# Patient Record
Sex: FEMALE | Race: WHITE
[De-identification: names, ages, dates, MRNs, and addresses within clinical notes are randomized per-mention and may not be internally consistent; named-entity substitution may affect disease eponyms.]

---

## 2019-07-16 ENCOUNTER — HOSPITAL ENCOUNTER (INPATIENT)
Dept: HOSPITAL 36 - ER | Age: 49
LOS: 7 days | Discharge: NURSING, REHAB OR LONG-TERM CARE | DRG: 885 | End: 2019-07-23
Attending: PSYCHIATRY & NEUROLOGY | Admitting: PSYCHIATRY & NEUROLOGY
Payer: MEDICARE

## 2019-07-16 VITALS — SYSTOLIC BLOOD PRESSURE: 113 MMHG | DIASTOLIC BLOOD PRESSURE: 78 MMHG

## 2019-07-16 DIAGNOSIS — Z88.8: ICD-10-CM

## 2019-07-16 DIAGNOSIS — F29: ICD-10-CM

## 2019-07-16 DIAGNOSIS — E78.5: ICD-10-CM

## 2019-07-16 DIAGNOSIS — J44.9: ICD-10-CM

## 2019-07-16 DIAGNOSIS — K59.09: ICD-10-CM

## 2019-07-16 DIAGNOSIS — E11.9: ICD-10-CM

## 2019-07-16 DIAGNOSIS — F31.9: ICD-10-CM

## 2019-07-16 DIAGNOSIS — F20.0: Primary | ICD-10-CM

## 2019-07-16 DIAGNOSIS — I10: ICD-10-CM

## 2019-07-16 LAB
AMPHET UR-MCNC: NEGATIVE NG/ML
APPEARANCE UR: CLEAR
BACTERIA #/AREA URNS HPF: (no result) /HPF
BARBITURATES UR-MCNC: NEGATIVE UG/ML
BENZODIAZEPINES PNL UR: NEGATIVE
BILIRUB UR-MCNC: NEGATIVE MG/DL
CANNABINOIDS SERPL QL CFM: NEGATIVE
COCAINE METAB.OTHER UR-MCNC: NEGATIVE NG/ML
COLOR UR: YELLOW
EPI CELLS URNS QL MICRO: (no result) /LPF
GLUCOSE UR STRIP-MCNC: NEGATIVE MG/DL
KETONES UR STRIP-MCNC: NEGATIVE MG/DL
LEUKOCYTE ESTERASE UR-ACNC: NEGATIVE
METHADONE UR CFM-MCNC: NEGATIVE NG/ML
METHAMPHET UR QL: NEGATIVE
MICRO URNS: YES
NITRITE UR QL STRIP: NEGATIVE
OPIATES UR QL: NEGATIVE
PCP UR-MCNC: NEGATIVE UG/L
PH UR STRIP: 6 [PH] (ref 4.6–8)
PROT UR STRIP-MCNC: NEGATIVE MG/DL
RBC # UR STRIP: NEGATIVE /UL
RBC #/AREA URNS HPF: (no result) /HPF (ref 0–5)
SP GR UR STRIP: <= 1.005 (ref 1–1.03)
TRICYCLICS UR QL: NEGATIVE
URINALYSIS COMPLETE PNL UR: (no result)
UROBILINOGEN UR STRIP-ACNC: 0.2 E.U./DL (ref 0.2–1)
WBC #/AREA URNS HPF: (no result) /HPF (ref 0–5)

## 2019-07-16 PROCEDURE — Z7610: HCPCS

## 2019-07-16 NOTE — ED PHYSICIAN CHART
ED Chief Complaint/HPI





- Patient Information


Date Seen:: 07/16/19


Time Seen:: 14:15


Chief Complaint:: Agitation


History of Present Illness:: 





onset x 2 days of agitation; no report of trauma, H/As, SIs, neck pain, C/P, SOB

, Abd. Pain, or urinary s/s


Allergies:: 


 Allergies











Allergy/AdvReac Type Severity Reaction Status Date / Time


 


clonazepam [From Klonopin] Allergy   Verified 07/16/19 14:26


 


haloperidol [From Haldol] Allergy   Verified 07/16/19 14:25


 


meloxicam [From Mobic] Allergy   Verified 07/16/19 14:26











Vitals:: 


 Vital Signs - 8 hr











  07/16/19





  14:14


 


Temp 98.5 F


 


HR 86


 


RR 16


 


/82


 


O2 Sat % 98











Historian:: Patient, EMS


Review:: Nurse's Note Reviewed, Old Chart Reviewed, EMS run form Reviewed





ED Review of Systems





- Review of Systems


General/Constitutional: No fever, No chills, No weight loss, No weakness, No 

diaphoresis, No edema, No loss of appetite


Skin: No skin lesions, No rash, No bruising


Head: No headache, No light-headedness


Eyes: No loss of vision, No pain, No diplopia


ENT: No earache, No nasal drainage, No sore throat, No tinnitus


Neck: No neck pain, No swelling, No thyromegaly, No stiffness, No mass noted


Cardio Vascular: No chest pain, No palpitations, No PND, No orthopnea, No edema


Pulmonary: No SOB, No cough, No sputum, No wheezing


GI: No nausea, No vomiting, No diarrhea, No pain, No melena, No hematochezia, 

No constipation, No hematemesis


G/U: No dysuria, No frequency, No hematuria, No nacturia


Ob/Gyn: No vaginal discharge, No abnormal vaginal bleed, No contraction


Musculoskeletal: No bone or joint pain, No back pain, No muscle pain


Endocrine: No polyuria, No polydipsia


Psychiatric: Prior psych history, Depression, Anxiety, No suicidal ideation, No 

homicidal ideation, No auditory hallucination, No visual hallucination


Hematopoietic: No bruising, No lymphadenopathy


Allergic/Immuno: No urticaria, No angioedema


Neurological: No syncope, No focal symptoms, No weakness, No paresthesia, No 

headache, No seizure, No dizziness, No confusion, No vertigo





ED Past Medical History





- Past Medical History


Obtainable: Yes


Past Medical History: HTN


Family History: HTN


Social History: Non Smoker, No Alcohol, No Drug Use, Single, Care Facility


Surgical History: None


Psychiatricy History: Depression, Bipolar


Medication: Reviewed





Family Medical History





- Family Member


  ** Mother


History Unknown: Yes





ED Physical Exam





- Physical Examination


General/Constitutional: Awake, Well-developed, well-nourished, Alert, No 

distress, GCS 15, Non-toxic appearing, Ambulatory


Head: Atraumatic


Eyes: Lids, conjuctiva normal, PERRL, EOMI


Skin: Nl inspection, No rash, No skin lesions, No ecchymosis, Well hydrated, No 

lymphadenopathy


ENMT: External ears, nose nl, TM canals nl, Nasal exam nl, Lips, teeth, gums nl

, Oropharynx nl, Tonsils nl


Neck: Nontender, Full ROM w/o pain, No JVD, No nuchal rigidity, No bruit, No 

mass, No stridor


Respiratory: Nl effort/Exclusion, Clear to Auscultation, No Wheeze/Rhonchi/Rales


Cardio Vascular: RRR, No murmur, gallop, rubs, NL S1 S2, Carotid/Femoral/Distal 

pulses equal bilaterally


GI: No tenderness/rebounding/guarding, No organomegaly, No hernia, Normal BS's, 

Nondistended, No mass/bruits, No McBurney tenderness


: No CVA tenderness


Extremities: No tenderness or effusion, Full ROM, normal strength in all 

extremities, No edema, Normal digits & nails


Neuro/Psych: Alert/oriented, DTR's symmetric, Normal sensory exam, Normal motor 

strength, Judgement/insight normal, Mood normal, Normal gait, No focal deficits


Other Neuro/Psych comments:: 





+ Psychomotor Agitation; no SIs; Mood/Affect: Labile


Misc: Normal back, No paraspinal tenderness





ED Labs/Radiology/EKG Results





- Lab Results


Results: 


 Laboratory Tests











  07/16/19 07/16/19 07/16/19





  14:15 15:15 15:15


 


Urine Source    CLEAN C


 


Urine Color    YELLOW


 


Urine Clarity    CLEAR


 


Urine pH    6.0


 


Ur Specific Gravity    <= 1.005


 


Urine Protein    NEGATIVE


 


Urine Glucose (UA)    NEGATIVE


 


Urine Ketones    NEGATIVE


 


Urine Blood    NEGATIVE


 


Urine Nitrate    NEGATIVE


 


Urine Bilirubin    NEGATIVE


 


Urine Urobilinogen    0.2


 


Ur Leukocyte Esterase    NEGATIVE


 


Urine RBC    0-2


 


Urine WBC    0-2


 


Ur Epithelial Cells    FEW


 


Urine Bacteria    FEW


 


Urine Pregnancy Test  NEGATIVE  


 


Urine Opiates Screen   NEGATIVE 


 


Urine Methadone Screen   NEGATIVE 


 


Ur Barbiturates Screen   NEGATIVE 


 


Ur Tricyclics Screen   NEGATIVE 


 


Ur Phencyclidine Scrn   NEGATIVE 


 


Amphetamines Screen   NEGATIVE 


 


U Methamphetamines Scrn   NEGATIVE 


 


U Benzodiazepines Scrn   NEGATIVE 


 


U Cocaine Metab Screen   NEGATIVE 


 


U Cannabinoids Screen   NEGATIVE 











Comments:: 





Reviewed





- EKG Interpretations


EKG Time:: 15:03


Rate & Rhythm: 79; NSR


Comments:: 





small T-Wave Inversions in V1 and V2; non-specific st-t changes





ED Septic Shock





- .


Is Septic Shock (SBP<90, OR Lactate>4 mmol\L) present?: No





- <6hrs of presentation:


Vital Signs: 


 Vital Signs - 8 hr











  07/16/19





  14:14


 


Temp 98.5 F


 


HR 86


 


RR 16


 


/82


 


O2 Sat % 98














ED Reassessment (Disposition)





- Reassessment


Reassessment Condition:: Improved





- Diagnosis


Diagnosis:: 





Agitation; Medical Clearance; Bipolar Disorder





- Aftercare/Follow up Instructions


Aftercare/Follow-Up Instructions:: Counseled pt regarding lab results/diagnosis 

& need follow up, Counseled pt & family regarding lab results/diagnosis & need 

follow up





- Patient Disposition


Discharge/Transfer:: Acute Care w/in this hosp


Admitted to:: [facility]


Condition at Disposition:: Stable, Improved

## 2019-07-16 NOTE — HISTORY & PHYSICAL
ADMIT DATE:  07/16/2019



HISTORY OF PRESENT ILLNESS:  The patient is a 48-year-old female with long

history of diabetes mellitus, hyperlipidemia, psychosis, admitted to [facility] under Dr. Sotelo's service.  The patient denies any chest

pain, shortness of breath, nausea, vomiting, fever or chills.



PAST MEDICAL HISTORY:  Significant for diabetes mellitus, hyperlipidemia,

chronic constipation, psychosis.



PAST SURGICAL HISTORY:  No recent surgery.



ALLERGIES:  None.



MEDICATIONS:  Follow admission reconciliation.



SOCIAL HISTORY:  No smoker, no alcohol, no drug.



FAMILY HISTORY:  Noncontributory.



REVIEW OF SYSTEMS:

RENAL SYSTEM:  No history of chronic renal disorder.

CARDIOVASCULAR SYSTEM:  No coronary artery disease.

ENDOCRINE SYSTEM:  She has diabetes mellitus.

GASTROINTESTINAL SYSTEM:  She has chronic constipation.

NEUROLOGICAL SYSTEM:  No seizure disorder.

SKELETOMUSCULAR SYSTEM:  No muscular dystrophy.

HEMATOLOGIC SYSTEM:  No bleeding tendencies.

RESPIRATORY SYSTEM:  No asthma.

GENITOURINARY:  No dysuria or hematuria.



PHYSICAL EXAMINATION:

GENERAL:  She is awake, alert.

VITAL SIGNS:  Temperature is 97.2, heart rate 74, blood pressure 112/75.

HEENT:  Normocephalic.  Pupils are reactive to light and accommodation.  Sclerae

clear.

NECK:  Supple.  Negative for lymphadenopathy, JVD or bruit.

CHEST:  Bilaterally normal.  No rales, rhonchi or wheezing.

HEART:  S1, S2 normal.  No murmur or gallop.

ABDOMEN:  Soft, bowel sounds positive.

EXTREMITIES:  No edema.

BACK:  Normal vertebrae.

BREASTS, PELVIC AND RECTAL:  Done by primary physician, no complaint.

NEUROLOGIC:  She is awake, alert, mildly confused.  No focal muscle deficits. 

Cranial nerves 2 through 12 are intact.



ASSESSMENT:

1.  Diabetes mellitus.

2.  Hyperlipidemia.

3.  Constipation.

4.  Psychosis.



PLAN:  The patient admitted to the hospital under Dr. Sotelo's service.  Medical

problem addressed during this hospitalization is psychosis.  Medical problems

addressed at discharge, diabetes mellitus, hyperlipidemia.  The patient is

medically stable for activity.



Thank you Dr. Sotelo for asking me to see your patient.  The patient is a full

code.





DD: 07/16/2019 22:59

DT: 07/16/2019 23:17

JOB# [id number]  [id number]

## 2019-07-17 LAB
ALBUMIN SERPL-MCNC: 4.6 GM/DL (ref 3.7–5.3)
ALBUMIN/GLOB SERPL: 1.5 {RATIO} (ref 1–1.8)
ALP SERPL-CCNC: 74 U/L (ref 34–104)
ALT SERPL-CCNC: 19 U/L (ref 7–52)
ANION GAP SERPL CALC-SCNC: 13.6 MMOL/L (ref 7–16)
APAP SERPL-MCNC: < 10 UG/ML (ref 10–30)
AST SERPL-CCNC: 19 U/L (ref 13–39)
BASOPHILS # BLD AUTO: 0.1 TH/CUMM (ref 0–0.2)
BASOPHILS NFR BLD AUTO: 0.8 % (ref 0–2)
BILIRUB SERPL-MCNC: 0.3 MG/DL (ref 0.3–1)
BUN SERPL-MCNC: 7 MG/DL (ref 7–25)
CALCIUM SERPL-MCNC: 9.8 MG/DL (ref 8.6–10.3)
CHLORIDE SERPL-SCNC: 97 MEQ/L (ref 98–107)
CHOLEST SERPL-MCNC: 156 MG/DL (ref ?–200)
CO2 SERPL-SCNC: 23.5 MEQ/L (ref 21–31)
CREAT SERPL-MCNC: 0.6 MG/DL (ref 0.6–1.2)
EOSINOPHIL # BLD AUTO: 0.1 TH/CMM (ref 0.1–0.4)
EOSINOPHIL NFR BLD AUTO: 2 % (ref 0–5)
ERYTHROCYTE [DISTWIDTH] IN BLOOD BY AUTOMATED COUNT: 12.3 % (ref 11.5–20)
GLOBULIN SER-MCNC: 3.1 GM/DL
GLUCOSE SERPL-MCNC: 176 MG/DL (ref 70–105)
HCT VFR BLD CALC: 42.4 % (ref 41–60)
HDLC SERPL-MCNC: 57 MG/DL (ref 23–92)
HGB BLD-MCNC: 13.9 GM/DL (ref 12–16)
LYMPHOCYTE AB SER FC-ACNC: 2 TH/CMM (ref 1.5–3)
LYMPHOCYTES NFR BLD AUTO: 30.1 % (ref 20–50)
MCH RBC QN AUTO: 30.4 PG (ref 27–31)
MCHC RBC AUTO-ENTMCNC: 32.7 PG (ref 28–36)
MCV RBC AUTO: 92.9 FL (ref 81–100)
MONOCYTES # BLD AUTO: 0.6 TH/CMM (ref 0.3–1)
MONOCYTES NFR BLD AUTO: 9.9 % (ref 2–10)
NEUTROPHILS # BLD: 3.7 TH/CMM (ref 1.8–8)
NEUTROPHILS NFR BLD AUTO: 57.2 % (ref 40–80)
PLATELET # BLD: 315 TH/CMM (ref 150–400)
POTASSIUM SERPL-SCNC: 4.1 MEQ/L (ref 3.5–5.1)
RBC # BLD AUTO: 4.56 MIL/CMM (ref 3.8–5.1)
SODIUM SERPL-SCNC: 130 MEQ/L (ref 136–145)
TRIGL SERPL-MCNC: 114 MG/DL (ref ?–150)
WBC # BLD AUTO: 6.5 TH/CMM (ref 4.8–10.8)

## 2019-07-17 NOTE — INTERNAL MEDICINE PROG NOTE
Internal Medicine Subjective





- Subjective


Service Date: 07/17/19


Patient seen and examined:: with staff


Patient is:: awake, verbal, in bed, talking, confused


Per staff patient has:: no adverse event





Internal Medicine Objective





- Results


Result Diagrams: 


 07/17/19 07:00





 07/17/19 07:00


Recent Labs: 


 Laboratory Last Values











WBC  6.5 Th/cmm (4.8-10.8)   07/17/19  07:00    


 


RBC  4.56 Mil/cmm (3.80-5.10)   07/17/19  07:00    


 


Hgb  13.9 gm/dL (12-16)   07/17/19  07:00    


 


Hct  42.4 % (41.0-60)   07/17/19  07:00    


 


MCV  92.9 fl ()   07/17/19  07:00    


 


MCH  30.4 pg (27.0-31.0)   07/17/19  07:00    


 


MCHC Differential  32.7 pg (28.0-36.0)   07/17/19  07:00    


 


RDW  12.3 % (11.5-20.0)   07/17/19  07:00    


 


Plt Count  315 Th/cmm (150-400)   07/17/19  07:00    


 


MPV  7.3 fl  07/17/19  07:00    


 


Neutrophils %  57.2 % (40.0-80.0)   07/17/19  07:00    


 


Lymphocytes %  30.1 % (20.0-50.0)   07/17/19  07:00    


 


Monocytes %  9.9 % (2.0-10.0)   07/17/19  07:00    


 


Eosinophils %  2.0 % (0.0-5.0)   07/17/19  07:00    


 


Basophils %  0.8 % (0.0-2.0)   07/17/19  07:00    


 


Sodium  130 mEq/L (136-145)  L  07/17/19  07:00    


 


Potassium  4.1 mEq/L (3.5-5.1)   07/17/19  07:00    


 


Chloride  97 mEq/L ()  L  07/17/19  07:00    


 


Carbon Dioxide  23.5 mEq/L (21.0-31.0)   07/17/19  07:00    


 


Anion Gap  13.6  (7.0-16.0)   07/17/19  07:00    


 


BUN  7 mg/dL (7-25)   07/17/19  07:00    


 


Creatinine  0.6 mg/dL (0.6-1.2)   07/17/19  07:00    


 


Est GFR ( Amer)  > 60.0 ml/min (>90)   07/17/19  07:00    


 


Est GFR (Non-Af Amer)  > 60.0 ml/min  07/17/19  07:00    


 


BUN/Creatinine Ratio  11.7   07/17/19  07:00    


 


Glucose  176 mg/dL ()  H  07/17/19  07:00    


 


Calcium  9.8 mg/dL (8.6-10.3)   07/17/19  07:00    


 


Total Bilirubin  0.3 mg/dL (0.3-1.0)   07/17/19  07:00    


 


AST  19 U/L (13-39)   07/17/19  07:00    


 


ALT  19 U/L (7-52)   07/17/19  07:00    


 


Alkaline Phosphatase  74 U/L ()   07/17/19  07:00    


 


Troponin I  < 0.01 ng/mL (0.01-0.05)  L  07/17/19  07:00    


 


Total Protein  7.7 gm/dL (6.0-8.3)   07/17/19  07:00    


 


Albumin  4.6 gm/dL (3.7-5.3)   07/17/19  07:00    


 


Globulin  3.1 gm/dL  07/17/19  07:00    


 


Albumin/Globulin Ratio  1.5  (1.0-1.8)   07/17/19  07:00    


 


Triglycerides  114 mg/dL (<150)   07/17/19  07:00    


 


Cholesterol  156 mg/dL (<200)   07/17/19  07:00    


 


LDL Cholesterol Direct  77 mg/dL ()   07/17/19  07:00    


 


HDL Cholesterol  57 mg/dL (23-92)   07/17/19  07:00    


 


TSH  1.25 uIU/ml (0.34-5.60)   07/17/19  07:00    


 


Serum Pregnancy, Qual  NEGATIVE  (NEGATIVE)   07/17/19  07:00    


 


Urine Source  CLEAN C   07/16/19  15:15    


 


Urine Color  YELLOW   07/16/19  15:15    


 


Urine Clarity  CLEAR  (CLEAR)   07/16/19  15:15    


 


Urine pH  6.0  (4.6 - 8.0)   07/16/19  15:15    


 


Ur Specific Gravity  <= 1.005  (1.005-1.030)   07/16/19  15:15    


 


Urine Protein  NEGATIVE mg/dL (NEGATIVE)   07/16/19  15:15    


 


Urine Glucose (UA)  NEGATIVE mg/dL (NEGATIVE)   07/16/19  15:15    


 


Urine Ketones  NEGATIVE mg/dL (NEGATIVE)   07/16/19  15:15    


 


Urine Blood  NEGATIVE  (NEGATIVE)   07/16/19  15:15    


 


Urine Nitrate  NEGATIVE  (NEGATIVE)   07/16/19  15:15    


 


Urine Bilirubin  NEGATIVE  (NEGATIVE)   07/16/19  15:15    


 


Urine Urobilinogen  0.2 E.U./dL (0.2 - 1.0)   07/16/19  15:15    


 


Ur Leukocyte Esterase  NEGATIVE  (NEGATIVE)   07/16/19  15:15    


 


Urine RBC  0-2 /hpf (0-5)   07/16/19  15:15    


 


Urine WBC  0-2 /hpf (0-5)   07/16/19  15:15    


 


Ur Epithelial Cells  FEW /lpf (FEW)   07/16/19  15:15    


 


Urine Bacteria  FEW /hpf (NONE SEEN)   07/16/19  15:15    


 


Urine Pregnancy Test  NEGATIVE   07/16/19  14:15    


 


Salicylates  < 25.0 mg/L (30.0-100.0)  L  07/17/19  07:00    


 


Urine Opiates Screen  NEGATIVE  (NEGATIVE)   07/16/19  15:15    


 


Urine Methadone Screen  NEGATIVE  (NEGATIVE)   07/16/19  15:15    


 


Acetaminophen  < 10.0 ug/mL (10.0-30.0)  L  07/17/19  07:00    


 


Ur Barbiturates Screen  NEGATIVE  (NEGATIVE)   07/16/19  15:15    


 


Ur Tricyclics Screen  NEGATIVE  (NEGATIVE)   07/16/19  15:15    


 


Ur Phencyclidine Scrn  NEGATIVE  (NEGATIVE)   07/16/19  15:15    


 


Amphetamines Screen  NEGATIVE  (NEGATIVE)   07/16/19  15:15    


 


U Methamphetamines Scrn  NEGATIVE  (NEGATIVE)   07/16/19  15:15    


 


U Benzodiazepines Scrn  NEGATIVE  (NEGATIVE)   07/16/19  15:15    


 


U Cocaine Metab Screen  NEGATIVE  (NEGATIVE)   07/16/19  15:15    


 


U Cannabinoids Screen  NEGATIVE  (NEGATIVE)   07/16/19  15:15    


 


Ethyl Alcohol  < 10 mg/dL (0-10)   07/17/19  07:00    














- Physical Exam


Vitals and I&O: 


 Vital Signs











Temp  97.3 F   07/17/19 14:00


 


Pulse  78   07/17/19 14:00


 


Resp  20   07/17/19 14:00


 


BP  132/88   07/17/19 14:00


 


Pulse Ox  97   07/17/19 14:00








 Intake & Output











 07/17/19 07/17/19 07/18/19





 06:59 18:59 06:59


 


Intake Total 300 1200 


 


Balance 300 1200 


 


Intake:   


 


  Oral 300 1200 


 


Other:   


 


  # Voids 3  


 


  # Bowel Movements 0 1 











Active Medications: 


Current Medications





Acetaminophen (Tylenol)  650 mg PO Q4HR PRN


   PRN Reason: Mild Pain / Temp above 100


   Stop: 09/14/19 18:14


Al Hydrox/Mg Hydrox/Simethicone (Maalox)  30 ml PO Q4HR PRN


   PRN Reason: GI DISTRESS


   Stop: 09/14/19 18:14


Atorvastatin Calcium (Lipitor)  10 mg PO HS [facility]; Protocol


   Stop: 09/14/19 20:59


   Last Admin: 07/16/19 21:11 Dose:  10 mg


Carbamazepine (Tegretol)  200 mg PO TID [facility]; Protocol


   Stop: 09/14/19 20:59


Docusate Sodium (Colace)  250 mg PO BID [facility]


   Stop: 09/15/19 08:59


   Last Admin: 07/17/19 16:19 Dose:  Not Given


Fluphenazine HCl (Prolixin)  5 mg PO DAILY [facility]; Protocol


   Stop: 09/15/19 08:59


Glipizide (Glucotrol)  5 mg PO BID [facility]


   Stop: 09/15/19 08:59


   Last Admin: 07/17/19 16:20 Dose:  5 mg


Lorazepam (Ativan)  0.5 mg PO Q4HR PRN; Protocol


   PRN Reason: Agitation


   Stop: 08/15/19 18:14


Magnesium Hydroxide (Milk Of Magnesia)  30 ml PO HS PRN


   PRN Reason: Constipation


Metformin HCl (Glucophage)  1,000 mg PO BID [facility]


   Stop: 09/15/19 08:59


   Last Admin: 07/17/19 16:20 Dose:  1,000 mg


Miscellaneous (Clinical Monitoring)  1 ea [facility] DAILY PRN


   PRN Reason: RENAL DOSING (METFORMIN)


   Stop: 09/15/19 07:34


Multivitamins/Vitamin C (Theragran)  1 tab PO DAILY [facility]


   Stop: 09/15/19 08:59


   Last Admin: 07/17/19 08:27 Dose:  1 tab


Olanzapine (Zyprexa)  10 mg PO DAILY [facility]; Protocol


   Stop: 09/15/19 08:59


Olanzapine (Zyprexa)  15 mg PO HS [facility]; Protocol


   Stop: 09/14/19 20:59


Zolpidem Tartrate (Ambien)  5 mg PO HS PRN


   PRN Reason: Insomnia


   Stop: 09/14/19 18:14


   Last Admin: 07/16/19 21:11 Dose:  5 mg








General: demented


HEENT: NC/AT, PERRLA, EOMI, anicteric sclerae, throat clear


Neck: Supple, No JVD, No thyromegaly, +2 carotid pulse wo bruit, No LAD


Abdomen: soft, non-tender, non-distended


Extremities: clear


Neurological: no change





Internal Medicine Assmt/Plan





- Assessment


Assessment: 





1.DM.


2.HYPERLIPIDEMIA.


3.CONSTIPATION.


4.PSYCHOSIS





- Plan


Plan: 





CONTINUE ON CURRENT MEDICATION AND DIET

## 2019-07-18 LAB — HBA1C BLD-MCNC: 5.6 % (ref 4.8–5.6)

## 2019-07-18 NOTE — INTERNAL MEDICINE PROG NOTE
Internal Medicine Subjective





- Subjective


Service Date: 07/18/19


Patient seen and examined:: without staff (SHE FEELS WELL)


Patient is:: awake, verbal, in bed, talking, confused


Per staff patient has:: no adverse event





Internal Medicine Objective





- Results


Result Diagrams: 


 07/17/19 07:00





 07/17/19 07:00


Recent Labs: 


 Laboratory Last Values











WBC  6.5 Th/cmm (4.8-10.8)   07/17/19  07:00    


 


RBC  4.56 Mil/cmm (3.80-5.10)   07/17/19  07:00    


 


Hgb  13.9 gm/dL (12-16)   07/17/19  07:00    


 


Hct  42.4 % (41.0-60)   07/17/19  07:00    


 


MCV  92.9 fl ()   07/17/19  07:00    


 


MCH  30.4 pg (27.0-31.0)   07/17/19  07:00    


 


MCHC Differential  32.7 pg (28.0-36.0)   07/17/19  07:00    


 


RDW  12.3 % (11.5-20.0)   07/17/19  07:00    


 


Plt Count  315 Th/cmm (150-400)   07/17/19  07:00    


 


MPV  7.3 fl  07/17/19  07:00    


 


Neutrophils %  57.2 % (40.0-80.0)   07/17/19  07:00    


 


Lymphocytes %  30.1 % (20.0-50.0)   07/17/19  07:00    


 


Monocytes %  9.9 % (2.0-10.0)   07/17/19  07:00    


 


Eosinophils %  2.0 % (0.0-5.0)   07/17/19  07:00    


 


Basophils %  0.8 % (0.0-2.0)   07/17/19  07:00    


 


Sodium  130 mEq/L (136-145)  L  07/17/19  07:00    


 


Potassium  4.1 mEq/L (3.5-5.1)   07/17/19  07:00    


 


Chloride  97 mEq/L ()  L  07/17/19  07:00    


 


Carbon Dioxide  23.5 mEq/L (21.0-31.0)   07/17/19  07:00    


 


Anion Gap  13.6  (7.0-16.0)   07/17/19  07:00    


 


BUN  7 mg/dL (7-25)   07/17/19  07:00    


 


Creatinine  0.6 mg/dL (0.6-1.2)   07/17/19  07:00    


 


Est GFR ( Amer)  > 60.0 ml/min (>90)   07/17/19  07:00    


 


Est GFR (Non-Af Amer)  > 60.0 ml/min  07/17/19  07:00    


 


BUN/Creatinine Ratio  11.7   07/17/19  07:00    


 


Glucose  176 mg/dL ()  H  07/17/19  07:00    


 


Calcium  9.8 mg/dL (8.6-10.3)   07/17/19  07:00    


 


Total Bilirubin  0.3 mg/dL (0.3-1.0)   07/17/19  07:00    


 


AST  19 U/L (13-39)   07/17/19  07:00    


 


ALT  19 U/L (7-52)   07/17/19  07:00    


 


Alkaline Phosphatase  74 U/L ()   07/17/19  07:00    


 


Troponin I  < 0.01 ng/mL (0.01-0.05)  L  07/17/19  07:00    


 


Total Protein  7.7 gm/dL (6.0-8.3)   07/17/19  07:00    


 


Albumin  4.6 gm/dL (3.7-5.3)   07/17/19  07:00    


 


Globulin  3.1 gm/dL  07/17/19  07:00    


 


Albumin/Globulin Ratio  1.5  (1.0-1.8)   07/17/19  07:00    


 


Triglycerides  114 mg/dL (<150)   07/17/19  07:00    


 


Cholesterol  156 mg/dL (<200)   07/17/19  07:00    


 


LDL Cholesterol Direct  77 mg/dL ()   07/17/19  07:00    


 


HDL Cholesterol  57 mg/dL (23-92)   07/17/19  07:00    


 


TSH  1.25 uIU/ml (0.34-5.60)   07/17/19  07:00    


 


Serum Pregnancy, Qual  NEGATIVE  (NEGATIVE)   07/17/19  07:00    


 


Urine Source  CLEAN C   07/16/19  15:15    


 


Urine Color  YELLOW   07/16/19  15:15    


 


Urine Clarity  CLEAR  (CLEAR)   07/16/19  15:15    


 


Urine pH  6.0  (4.6 - 8.0)   07/16/19  15:15    


 


Ur Specific Gravity  <= 1.005  (1.005-1.030)   07/16/19  15:15    


 


Urine Protein  NEGATIVE mg/dL (NEGATIVE)   07/16/19  15:15    


 


Urine Glucose (UA)  NEGATIVE mg/dL (NEGATIVE)   07/16/19  15:15    


 


Urine Ketones  NEGATIVE mg/dL (NEGATIVE)   07/16/19  15:15    


 


Urine Blood  NEGATIVE  (NEGATIVE)   07/16/19  15:15    


 


Urine Nitrate  NEGATIVE  (NEGATIVE)   07/16/19  15:15    


 


Urine Bilirubin  NEGATIVE  (NEGATIVE)   07/16/19  15:15    


 


Urine Urobilinogen  0.2 E.U./dL (0.2 - 1.0)   07/16/19  15:15    


 


Ur Leukocyte Esterase  NEGATIVE  (NEGATIVE)   07/16/19  15:15    


 


Urine RBC  0-2 /hpf (0-5)   07/16/19  15:15    


 


Urine WBC  0-2 /hpf (0-5)   07/16/19  15:15    


 


Ur Epithelial Cells  FEW /lpf (FEW)   07/16/19  15:15    


 


Urine Bacteria  FEW /hpf (NONE SEEN)   07/16/19  15:15    


 


Urine Pregnancy Test  NEGATIVE   07/16/19  14:15    


 


Salicylates  < 25.0 mg/L (30.0-100.0)  L  07/17/19  07:00    


 


Urine Opiates Screen  NEGATIVE  (NEGATIVE)   07/16/19  15:15    


 


Urine Methadone Screen  NEGATIVE  (NEGATIVE)   07/16/19  15:15    


 


Acetaminophen  < 10.0 ug/mL (10.0-30.0)  L  07/17/19  07:00    


 


Ur Barbiturates Screen  NEGATIVE  (NEGATIVE)   07/16/19  15:15    


 


Ur Tricyclics Screen  NEGATIVE  (NEGATIVE)   07/16/19  15:15    


 


Ur Phencyclidine Scrn  NEGATIVE  (NEGATIVE)   07/16/19  15:15    


 


Amphetamines Screen  NEGATIVE  (NEGATIVE)   07/16/19  15:15    


 


U Methamphetamines Scrn  NEGATIVE  (NEGATIVE)   07/16/19  15:15    


 


U Benzodiazepines Scrn  NEGATIVE  (NEGATIVE)   07/16/19  15:15    


 


U Cocaine Metab Screen  NEGATIVE  (NEGATIVE)   07/16/19  15:15    


 


U Cannabinoids Screen  NEGATIVE  (NEGATIVE)   07/16/19  15:15    


 


Ethyl Alcohol  < 10 mg/dL (0-10)   07/17/19  07:00    


 


RPR  NONREACTIVE  (NONREACTIVE)   07/17/19  07:00    














- Physical Exam


Vitals and I&O: 


 Vital Signs











Temp  98.8 F   07/18/19 21:05


 


Pulse  77   07/18/19 21:05


 


Resp  19   07/18/19 21:05


 


BP  114/71   07/18/19 21:05


 


Pulse Ox  99   07/18/19 21:05








 Intake & Output











 07/18/19 07/18/19 07/19/19





 06:59 18:59 06:59


 


Intake Total  1200 240


 


Balance  1200 240


 


Intake:   


 


  Oral  1200 240


 


Other:   


 


  # Voids  5 2


 


  # Bowel Movements  1 











Active Medications: 


Current Medications





Acetaminophen (Tylenol)  650 mg PO Q4HR PRN


   PRN Reason: Mild Pain / Temp above 100


   Stop: 09/14/19 18:14


Al Hydrox/Mg Hydrox/Simethicone (Maalox)  30 ml PO Q4HR PRN


   PRN Reason: GI DISTRESS


   Stop: 09/14/19 18:14


Atorvastatin Calcium (Lipitor)  10 mg PO HS [facility]; Protocol


   Stop: 09/14/19 20:59


   Last Admin: 07/18/19 20:30 Dose:  10 mg


Carbamazepine (Tegretol)  200 mg PO TID [facility]; Protocol


   Stop: 09/16/19 04:59


   Last Admin: 07/18/19 20:30 Dose:  200 mg


Docusate Sodium (Colace)  250 mg PO BID [facility]


   Stop: 09/15/19 08:59


   Last Admin: 07/18/19 17:06 Dose:  Not Given


Fluphenazine HCl (Prolixin)  5 mg PO DAILY [facility]; Protocol


   Stop: 09/15/19 08:59


   Last Admin: 07/18/19 09:02 Dose:  5 mg


Glipizide (Glucotrol)  5 mg PO BID [facility]


   Stop: 09/15/19 08:59


   Last Admin: 07/18/19 17:06 Dose:  5 mg


Lorazepam (Ativan)  0.5 mg PO Q4HR PRN; Protocol


   PRN Reason: Agitation


   Stop: 08/15/19 18:14


Magnesium Hydroxide (Milk Of Magnesia)  30 ml PO HS PRN


   PRN Reason: Constipation


Metformin HCl (Glucophage)  1,000 mg PO BID [facility]


   Stop: 09/15/19 08:59


   Last Admin: 07/18/19 17:06 Dose:  1,000 mg


Miscellaneous (Clinical Monitoring)  1 ea [facility] DAILY PRN


   PRN Reason: RENAL DOSING (METFORMIN)


   Stop: 09/15/19 07:34


Multivitamins/Vitamin C (Theragran)  1 tab PO DAILY [facility]


   Stop: 09/15/19 08:59


   Last Admin: 07/18/19 09:03 Dose:  1 tab


Olanzapine (Zyprexa)  10 mg PO DAILY [facility]; Protocol


   Stop: 09/15/19 08:59


   Last Admin: 07/18/19 09:03 Dose:  Not Given


Olanzapine (Zyprexa)  15 mg PO HS [facility]; Protocol


   Stop: 09/14/19 20:59


   Last Admin: 07/18/19 20:30 Dose:  15 mg


Zolpidem Tartrate (Ambien)  5 mg PO HS PRN


   PRN Reason: Insomnia


   Stop: 09/14/19 18:14


   Last Admin: 07/18/19 21:14 Dose:  5 mg








General: demented


HEENT: NC/AT, PERRLA, EOMI, anicteric sclerae, throat clear


Neck: Supple, No JVD, No thyromegaly, +2 carotid pulse wo bruit, No LAD


Abdomen: soft, non-tender, non-distended


Extremities: clear


Neurological: no change





Internal Medicine Assmt/Plan





- Assessment


Assessment: 





1.DM.


2.HYPERLIPIDEMIA.


3.CONSTIPATION.


4.PSYCHOSIS





- Plan


Plan: 





CONTINUE ON CURRENT MEDICATION AND DIET

## 2019-07-18 NOTE — PSYCHIATRIC EVALUATION
DATE OF SERVICE:  07/16/2019



INITIAL EVALUATION AND MENTAL STATUS EXAMINATION



AGE:  48.



SEX:  Female.



PHYSICIAN:  Dr. Sotelo.



CHIEF COMPLAINT:  Aggressive behavior, was yelling and screaming.



HISTORY OF PRESENT ILLNESS:  The patient is a 48-year-old female who lives in

[facility].  The patient was transferred to [facility]

because of increased yelling and screaming and the patient is severely

aggressive and agitated.  The patient also is trying to strike out at staff and

peers and also is non-directable.  She also is still suspicious and paranoid. 

The patient also is still actively responding to stimuli and is nonsensical

during the interview.



PAST PSYCHIATRIC HISTORY:  The patient has history of psychosis and what seems

to be a schizophrenic disorder.



PAST MEDICAL HISTORY:  The patient has diabetes mellitus, noninsulin and also

COPD, muscle atrophy and anemia.



SOCIAL HISTORY:  The patient lives in [facility].  No known

alcohol or drug use.



ALLERGIES:  HALDOL, KLONOPIN and MOBIC.



MENTAL STATUS EXAMINATION:  The patient appears her stated age.  Anxious. 

Irritable mood.  Flat affect.  Disheveled.  Actively responding to stimuli.  The

patient did not answer question regarding hallucinations or delusions, but

actively responding to stimuli.  The patient denies suicidal or homicidal

ideations.  The patient is alert and oriented to time, place, person and

situation.  Intact immediate, recent and remote memories.  Poor insight and poor

judgment.



ASSESSMENT:  PRIMARY DIAGNOSIS:  Schizophrenic disorder, severe, with psychotic

features.



TREATMENT PLAN:  We will monitor the patient's behavior and condition closely. 

We will start individual milieu psychotherapy.  We will monitor psychotropic

medications.



ESTIMATED LENGTH OF STAY:  5-7 days.



THE PATIENT'S STRENGTHS AND WEAKNESSES:  The patient's strength is not clear,

except that she is in relatively fair health and to have support from staff in

[place].  Weakness is her poor impulse control and her agitation.



AFTER DISCHARGE PLAN:  The patient will return to [place] with plans for

outpatient treatment and followup there.



CRITERIA FOR DISCHARGE:  The patient will not be psychotic and will stabilize

psychotropic medications and will have better impulse control and establish

outpatient treatment plans.





DD: 07/17/2019 20:19

DT: 07/17/2019 23:56

[facility]# [id number]  [id number]

## 2019-07-19 NOTE — INTERNAL MEDICINE PROG NOTE
Internal Medicine Subjective





- Subjective


Service Date: 07/19/19


Patient seen and examined:: with staff (SHE IS DOING WELL)


Patient is:: awake, verbal, in bed, talking, confused


Per staff patient has:: no adverse event





Internal Medicine Objective





- Results


Result Diagrams: 


 07/17/19 07:00





 07/17/19 07:00


Recent Labs: 


 Laboratory Last Values











WBC  6.5 Th/cmm (4.8-10.8)   07/17/19  07:00    


 


RBC  4.56 Mil/cmm (3.80-5.10)   07/17/19  07:00    


 


Hgb  13.9 gm/dL (12-16)   07/17/19  07:00    


 


Hct  42.4 % (41.0-60)   07/17/19  07:00    


 


MCV  92.9 fl ()   07/17/19  07:00    


 


MCH  30.4 pg (27.0-31.0)   07/17/19  07:00    


 


MCHC Differential  32.7 pg (28.0-36.0)   07/17/19  07:00    


 


RDW  12.3 % (11.5-20.0)   07/17/19  07:00    


 


Plt Count  315 Th/cmm (150-400)   07/17/19  07:00    


 


MPV  7.3 fl  07/17/19  07:00    


 


Neutrophils %  57.2 % (40.0-80.0)   07/17/19  07:00    


 


Lymphocytes %  30.1 % (20.0-50.0)   07/17/19  07:00    


 


Monocytes %  9.9 % (2.0-10.0)   07/17/19  07:00    


 


Eosinophils %  2.0 % (0.0-5.0)   07/17/19  07:00    


 


Basophils %  0.8 % (0.0-2.0)   07/17/19  07:00    


 


Sodium  130 mEq/L (136-145)  L  07/17/19  07:00    


 


Potassium  4.1 mEq/L (3.5-5.1)   07/17/19  07:00    


 


Chloride  97 mEq/L ()  L  07/17/19  07:00    


 


Carbon Dioxide  23.5 mEq/L (21.0-31.0)   07/17/19  07:00    


 


Anion Gap  13.6  (7.0-16.0)   07/17/19  07:00    


 


BUN  7 mg/dL (7-25)   07/17/19  07:00    


 


Creatinine  0.6 mg/dL (0.6-1.2)   07/17/19  07:00    


 


Est GFR ( Amer)  > 60.0 ml/min (>90)   07/17/19  07:00    


 


Est GFR (Non-Af Amer)  > 60.0 ml/min  07/17/19  07:00    


 


BUN/Creatinine Ratio  11.7   07/17/19  07:00    


 


Glucose  176 mg/dL ()  H  07/17/19  07:00    


 


Calcium  9.8 mg/dL (8.6-10.3)   07/17/19  07:00    


 


Total Bilirubin  0.3 mg/dL (0.3-1.0)   07/17/19  07:00    


 


AST  19 U/L (13-39)   07/17/19  07:00    


 


ALT  19 U/L (7-52)   07/17/19  07:00    


 


Alkaline Phosphatase  74 U/L ()   07/17/19  07:00    


 


Troponin I  < 0.01 ng/mL (0.01-0.05)  L  07/17/19  07:00    


 


Total Protein  7.7 gm/dL (6.0-8.3)   07/17/19  07:00    


 


Albumin  4.6 gm/dL (3.7-5.3)   07/17/19  07:00    


 


Globulin  3.1 gm/dL  07/17/19  07:00    


 


Albumin/Globulin Ratio  1.5  (1.0-1.8)   07/17/19  07:00    


 


Triglycerides  114 mg/dL (<150)   07/17/19  07:00    


 


Cholesterol  156 mg/dL (<200)   07/17/19  07:00    


 


LDL Cholesterol Direct  77 mg/dL ()   07/17/19  07:00    


 


HDL Cholesterol  57 mg/dL (23-92)   07/17/19  07:00    


 


TSH  1.25 uIU/ml (0.34-5.60)   07/17/19  07:00    


 


Serum Pregnancy, Qual  NEGATIVE  (NEGATIVE)   07/17/19  07:00    


 


Urine Source  CLEAN C   07/16/19  15:15    


 


Urine Color  YELLOW   07/16/19  15:15    


 


Urine Clarity  CLEAR  (CLEAR)   07/16/19  15:15    


 


Urine pH  6.0  (4.6 - 8.0)   07/16/19  15:15    


 


Ur Specific Gravity  <= 1.005  (1.005-1.030)   07/16/19  15:15    


 


Urine Protein  NEGATIVE mg/dL (NEGATIVE)   07/16/19  15:15    


 


Urine Glucose (UA)  NEGATIVE mg/dL (NEGATIVE)   07/16/19  15:15    


 


Urine Ketones  NEGATIVE mg/dL (NEGATIVE)   07/16/19  15:15    


 


Urine Blood  NEGATIVE  (NEGATIVE)   07/16/19  15:15    


 


Urine Nitrate  NEGATIVE  (NEGATIVE)   07/16/19  15:15    


 


Urine Bilirubin  NEGATIVE  (NEGATIVE)   07/16/19  15:15    


 


Urine Urobilinogen  0.2 E.U./dL (0.2 - 1.0)   07/16/19  15:15    


 


Ur Leukocyte Esterase  NEGATIVE  (NEGATIVE)   07/16/19  15:15    


 


Urine RBC  0-2 /hpf (0-5)   07/16/19  15:15    


 


Urine WBC  0-2 /hpf (0-5)   07/16/19  15:15    


 


Ur Epithelial Cells  FEW /lpf (FEW)   07/16/19  15:15    


 


Urine Bacteria  FEW /hpf (NONE SEEN)   07/16/19  15:15    


 


Urine Pregnancy Test  NEGATIVE   07/16/19  14:15    


 


Salicylates  < 25.0 mg/L (30.0-100.0)  L  07/17/19  07:00    


 


Urine Opiates Screen  NEGATIVE  (NEGATIVE)   07/16/19  15:15    


 


Urine Methadone Screen  NEGATIVE  (NEGATIVE)   07/16/19  15:15    


 


Acetaminophen  < 10.0 ug/mL (10.0-30.0)  L  07/17/19  07:00    


 


Ur Barbiturates Screen  NEGATIVE  (NEGATIVE)   07/16/19  15:15    


 


Ur Tricyclics Screen  NEGATIVE  (NEGATIVE)   07/16/19  15:15    


 


Ur Phencyclidine Scrn  NEGATIVE  (NEGATIVE)   07/16/19  15:15    


 


Amphetamines Screen  NEGATIVE  (NEGATIVE)   07/16/19  15:15    


 


U Methamphetamines Scrn  NEGATIVE  (NEGATIVE)   07/16/19  15:15    


 


U Benzodiazepines Scrn  NEGATIVE  (NEGATIVE)   07/16/19  15:15    


 


U Cocaine Metab Screen  NEGATIVE  (NEGATIVE)   07/16/19  15:15    


 


U Cannabinoids Screen  NEGATIVE  (NEGATIVE)   07/16/19  15:15    


 


Ethyl Alcohol  < 10 mg/dL (0-10)   07/17/19  07:00    


 


RPR  NONREACTIVE  (NONREACTIVE)   07/17/19  07:00    














- Physical Exam


Vitals and I&O: 


 Vital Signs











Temp  97.6 F   07/19/19 14:00


 


Pulse  100   07/19/19 14:00


 


Resp  18   07/19/19 14:00


 


BP  126/81   07/19/19 14:00


 


Pulse Ox  99   07/19/19 14:00








 Intake & Output











 07/19/19 07/19/19 07/20/19





 06:59 18:59 06:59


 


Intake Total 480 1200 


 


Balance 480 1200 


 


Intake:   


 


  Oral 480 1080 


 


  Other  120 


 


Other:   


 


  # Voids 2 3 


 


  # Bowel Movements  0 











Active Medications: 


Current Medications





Acetaminophen (Tylenol)  650 mg PO Q4HR PRN


   PRN Reason: Mild Pain / Temp above 100


   Stop: 09/14/19 18:14


Al Hydrox/Mg Hydrox/Simethicone (Maalox)  30 ml PO Q4HR PRN


   PRN Reason: GI DISTRESS


   Stop: 09/14/19 18:14


Atorvastatin Calcium (Lipitor)  10 mg PO HS [facility]; Protocol


   Stop: 09/14/19 20:59


   Last Admin: 07/18/19 20:30 Dose:  10 mg


Carbamazepine (Tegretol)  200 mg PO TID [facility]; Protocol


   Stop: 09/16/19 04:59


   Last Admin: 07/19/19 14:08 Dose:  Not Given


Docusate Sodium (Colace)  250 mg PO BID [facility]


   Stop: 09/15/19 08:59


   Last Admin: 07/19/19 16:38 Dose:  Not Given


Fluphenazine HCl (Prolixin)  5 mg PO DAILY [facility]; Protocol


   Stop: 09/15/19 08:59


   Last Admin: 07/19/19 08:49 Dose:  5 mg


Glipizide (Glucotrol)  5 mg PO BID [facility]


   Stop: 09/15/19 08:59


   Last Admin: 07/19/19 16:38 Dose:  5 mg


Lorazepam (Ativan)  0.5 mg PO Q4HR PRN; Protocol


   PRN Reason: Agitation


   Stop: 08/15/19 18:14


Magnesium Hydroxide (Milk Of Magnesia)  30 ml PO HS PRN


   PRN Reason: Constipation


Metformin HCl (Glucophage)  1,000 mg PO BID [facility]


   Stop: 09/15/19 08:59


   Last Admin: 07/19/19 16:38 Dose:  1,000 mg


Miscellaneous (Clinical Monitoring)  1 ea MC DAILY PRN


   PRN Reason: RENAL DOSING (METFORMIN)


   Stop: 09/15/19 07:34


Multivitamins/Vitamin C (Theragran)  1 tab PO DAILY [facility]


   Stop: 09/15/19 08:59


   Last Admin: 07/19/19 08:50 Dose:  1 tab


Olanzapine (Zyprexa)  10 mg PO DAILY [facility]; Protocol


   Stop: 09/15/19 08:59


   Last Admin: 07/19/19 08:48 Dose:  10 mg


Olanzapine (Zyprexa)  15 mg PO HS [facility]; Protocol


   Stop: 09/14/19 20:59


   Last Admin: 07/18/19 20:30 Dose:  15 mg


Zolpidem Tartrate (Ambien)  5 mg PO HS PRN


   PRN Reason: Insomnia


   Stop: 09/14/19 18:14


   Last Admin: 07/18/19 21:14 Dose:  5 mg








General: demented


HEENT: NC/AT, PERRLA, EOMI, anicteric sclerae, throat clear


Neck: Supple, No JVD, No thyromegaly, +2 carotid pulse wo bruit, No LAD


Abdomen: soft, non-tender, non-distended


Extremities: clear


Neurological: no change





Internal Medicine Assmt/Plan





- Assessment


Assessment: 





1.DM.


2.HYPERLIPIDEMIA.


3.CONSTIPATION.


4.PSYCHOSIS





- Plan


Plan: 





CONTINUE ON CURRENT MEDICATION AND DIET

## 2019-07-19 NOTE — PROGRESS NOTES
DATE:  07/19/2019



FOLLOWUP NOTE



A 48-year-old female living at [place] transferred due to increased yelling

episodes, stating that she is here for "major trauma," "psycho trauma,"

"spiritual incantations," "body changes," disorganized, not making any

sense.  "I healed my mind," "I resumed my sanity."  The patient was

apparently aggressive, striking out at staff, still psychotic appearing,

disorganized, tangential, talking nonsense.  Medications were noted.  Ongoing

safety concerns, ongoing concerns given her ongoing bizarre behaviors,

disorganized thought processes.  We will continue to monitor.  Medications were

noted including dosages and frequency.





DD: 07/19/2019 11:45

DT: 07/19/2019 22:58

JOB# [id number]  [id number]

## 2019-07-20 NOTE — GENERAL PROGRESS NOTE
Subjective





- Review of Systems


Service Date: 19


Subjective: 





resting comfortably


no distress





Objective





- Results


Result Diagrams: 


 19 07:00





 19 07:00


Recent Labs: 


 Laboratory Last Values











WBC  6.5 Th/cmm (4.8-10.8)   19  07:00    


 


RBC  4.56 Mil/cmm (3.80-5.10)   19  07:00    


 


Hgb  13.9 gm/dL (12-16)   19  07:00    


 


Hct  42.4 % (41.0-60)   19  07:00    


 


MCV  92.9 fl ()   19  07:00    


 


MCH  30.4 pg (27.0-31.0)   19  07:00    


 


MCHC Differential  32.7 pg (28.0-36.0)   19  07:00    


 


RDW  12.3 % (11.5-20.0)   19  07:00    


 


Plt Count  315 Th/cmm (150-400)   19  07:00    


 


MPV  7.3 fl  19  07:00    


 


Neutrophils %  57.2 % (40.0-80.0)   19  07:00    


 


Lymphocytes %  30.1 % (20.0-50.0)   19  07:00    


 


Monocytes %  9.9 % (2.0-10.0)   19  07:00    


 


Eosinophils %  2.0 % (0.0-5.0)   19  07:00    


 


Basophils %  0.8 % (0.0-2.0)   19  07:00    


 


Sodium  130 mEq/L (136-145)  L  19  07:00    


 


Potassium  4.1 mEq/L (3.5-5.1)   19  07:00    


 


Chloride  97 mEq/L ()  L  19  07:00    


 


Carbon Dioxide  23.5 mEq/L (21.0-31.0)   19  07:00    


 


Anion Gap  13.6  (7.0-16.0)   19  07:00    


 


BUN  7 mg/dL (7-25)   19  07:00    


 


Creatinine  0.6 mg/dL (0.6-1.2)   19  07:00    


 


Est GFR ( Amer)  > 60.0 ml/min (>90)   19  07:00    


 


Est GFR (Non-Af Amer)  > 60.0 ml/min  19  07:00    


 


BUN/Creatinine Ratio  11.7   19  07:00    


 


Glucose  176 mg/dL ()  H  19  07:00    


 


Calcium  9.8 mg/dL (8.6-10.3)   19  07:00    


 


Total Bilirubin  0.3 mg/dL (0.3-1.0)   19  07:00    


 


AST  19 U/L (13-39)   19  07:00    


 


ALT  19 U/L (7-52)   19  07:00    


 


Alkaline Phosphatase  74 U/L ()   19  07:00    


 


Troponin I  < 0.01 ng/mL (0.01-0.05)  L  19  07:00    


 


Total Protein  7.7 gm/dL (6.0-8.3)   19  07:00    


 


Albumin  4.6 gm/dL (3.7-5.3)   19  07:00    


 


Globulin  3.1 gm/dL  19  07:00    


 


Albumin/Globulin Ratio  1.5  (1.0-1.8)   19  07:00    


 


Triglycerides  114 mg/dL (<150)   19  07:00    


 


Cholesterol  156 mg/dL (<200)   19  07:00    


 


LDL Cholesterol Direct  77 mg/dL ()   19  07:00    


 


HDL Cholesterol  57 mg/dL (23-92)   19  07:00    


 


TSH  1.25 uIU/ml (0.34-5.60)   19  07:00    


 


Serum Pregnancy, Qual  NEGATIVE  (NEGATIVE)   19  07:00    


 


Urine Source  CLEAN C   19  15:15    


 


Urine Color  YELLOW   19  15:15    


 


Urine Clarity  CLEAR  (CLEAR)   19  15:15    


 


Urine pH  6.0  (4.6 - 8.0)   19  15:15    


 


Ur Specific Gravity  <= 1.005  (1.005-1.030)   19  15:15    


 


Urine Protein  NEGATIVE mg/dL (NEGATIVE)   19  15:15    


 


Urine Glucose (UA)  NEGATIVE mg/dL (NEGATIVE)   19  15:15    


 


Urine Ketones  NEGATIVE mg/dL (NEGATIVE)   19  15:15    


 


Urine Blood  NEGATIVE  (NEGATIVE)   19  15:15    


 


Urine Nitrate  NEGATIVE  (NEGATIVE)   19  15:15    


 


Urine Bilirubin  NEGATIVE  (NEGATIVE)   19  15:15    


 


Urine Urobilinogen  0.2 E.U./dL (0.2 - 1.0)   19  15:15    


 


Ur Leukocyte Esterase  NEGATIVE  (NEGATIVE)   19  15:15    


 


Urine RBC  0-2 /hpf (0-5)   19  15:15    


 


Urine WBC  0-2 /hpf (0-5)   19  15:15    


 


Ur Epithelial Cells  FEW /lpf (FEW)   19  15:15    


 


Urine Bacteria  FEW /hpf (NONE SEEN)   19  15:15    


 


Urine Pregnancy Test  NEGATIVE   19  14:15    


 


Salicylates  < 25.0 mg/L (30.0-100.0)  L  19  07:00    


 


Urine Opiates Screen  NEGATIVE  (NEGATIVE)   19  15:15    


 


Urine Methadone Screen  NEGATIVE  (NEGATIVE)   19  15:15    


 


Acetaminophen  < 10.0 ug/mL (10.0-30.0)  L  19  07:00    


 


Ur Barbiturates Screen  NEGATIVE  (NEGATIVE)   19  15:15    


 


Ur Tricyclics Screen  NEGATIVE  (NEGATIVE)   19  15:15    


 


Ur Phencyclidine Scrn  NEGATIVE  (NEGATIVE)   19  15:15    


 


Amphetamines Screen  NEGATIVE  (NEGATIVE)   19  15:15    


 


U Methamphetamines Scrn  NEGATIVE  (NEGATIVE)   19  15:15    


 


U Benzodiazepines Scrn  NEGATIVE  (NEGATIVE)   19  15:15    


 


U Cocaine Metab Screen  NEGATIVE  (NEGATIVE)   19  15:15    


 


U Cannabinoids Screen  NEGATIVE  (NEGATIVE)   19  15:15    


 


Ethyl Alcohol  < 10 mg/dL (0-10)   19  07:00    


 


RPR  NONREACTIVE  (NONREACTIVE)   19  07:00    














- Physical Exam


Vitals and I&O: 


 Vital Signs











Temp  97.7 F   19 14:00


 


Pulse  87   19 14:00


 


Resp  20   19 14:00


 


BP  124/76   19 14:00


 


Pulse Ox  100   19 14:00








 Intake & Output











 19





 18:59 06:59 18:59


 


Intake Total 1200 480 


 


Balance 1200 480 


 


Intake:   


 


  Oral 1080 480 


 


  Other 120  


 


Other:   


 


  # Voids 3 2 


 


  # Bowel Movements 0  











Active Medications: 


Current Medications





Acetaminophen (Tylenol)  650 mg PO Q4HR PRN


   PRN Reason: Mild Pain / Temp above 100


   Stop: 19 18:14


Al Hydrox/Mg Hydrox/Simethicone (Maalox)  30 ml PO Q4HR PRN


   PRN Reason: GI DISTRESS


   Stop: 19 18:14


Atorvastatin Calcium (Lipitor)  10 mg PO HS [facility]; Protocol


   Stop: 19 20:59


   Last Admin: 19 21:30 Dose:  10 mg


Carbamazepine (Tegretol)  200 mg PO TID [facility]; Protocol


   Stop: 19 04:59


   Last Admin: 19 13:04 Dose:  200 mg


Docusate Sodium (Colace)  250 mg PO BID [facility]


   Stop: 09/15/19 08:59


   Last Admin: 19 08:15 Dose:  Not Given


Fluphenazine HCl (Prolixin)  5 mg PO DAILY [facility]; Protocol


   Stop: 09/15/19 08:59


   Last Admin: 19 08:13 Dose:  5 mg


Glipizide (Glucotrol)  5 mg PO BID [facility]


   Stop: 09/15/19 08:59


   Last Admin: 19 08:13 Dose:  5 mg


Lorazepam (Ativan)  0.5 mg PO Q4HR PRN; Protocol


   PRN Reason: Agitation


   Stop: 08/15/19 18:14


Magnesium Hydroxide (Milk Of Magnesia)  30 ml PO HS PRN


   PRN Reason: Constipation


Metformin HCl (Glucophage)  1,000 mg PO BID [facility]


   Stop: 09/15/19 08:59


   Last Admin: 19 08:12 Dose:  1,000 mg


Miscellaneous (Clinical Monitoring)  1 ea MC DAILY PRN


   PRN Reason: RENAL DOSING (METFORMIN)


   Stop: 09/15/19 07:34


Multivitamins/Vitamin C (Theragran)  1 tab PO DAILY [facility]


   Stop: 09/15/19 08:59


   Last Admin: 19 08:13 Dose:  1 tab


Olanzapine (Zyprexa)  10 mg PO DAILY [facility]; Protocol


   Stop: 09/15/19 08:59


   Last Admin: 19 08:12 Dose:  10 mg


Olanzapine (Zyprexa)  15 mg PO HS [facility]; Protocol


   Stop: 19 20:59


   Last Admin: 19 21:30 Dose:  15 mg


Zolpidem Tartrate (Ambien)  5 mg PO HS PRN


   PRN Reason: Insomnia


   Stop: 19 18:14


   Last Admin: 19 21:30 Dose:  5 mg








General: No acute distress


HEENT: PERRLA


Neck: Supple, JVD


Cardiovascular: Regular rate, Normal S1, Normal S2


Lungs: Clear to auscultation


Abdomen: Bowel sounds, Soft





Assessment/Plan





- Assessment


Assessment: 





1.DM.


2.HYPERLIPIDEMIA.


3.CONSTIPATION.


4.PSYCHOSIS





- Plan


Plan: 





continue current treatment





Nutritional Asmnt/Malnutr-PDOC





- Dietary Evaluation


Malnutrition Findings (Please click <Entered> for more info): 








Nutritional Asmnt/Malnutrition                             Start:  19 11:

53


Text:                                                      Status: Complete    

  


Freq:                                                                          

  


Protocol:                                                                      

  


 Document     19 11:53  [facility]  (Rec: 19 12:13  [facility] DRIVER-

FNS1)


 Nutritional Asmnt/Malnutrition


     Patient General Information


      Nutritional Screening                      Moderate Risk


      Diagnosis                                  Psychosis


      Pertinent Medical Hx/Surgical Hx           Diabetes, Hyperlipidemia,


                                                 psychosis, chronic


                                                 conistipation


      Subjective Information                     [facility]


                                                 [facility]. Patient asleep in


                                                 bed at time of visit.


                                                 Tolerating current diet


                                                 without difficulty.


      Current Diet Order/ Nutrition Support      60gm CCHO, no added sodium


      Patient / S.O                              Not Indicated


      Pertinent Medications                      Maalox, Lipitor, Colace,


                                                 Glipizide, MOM, Metformin,


                                                 Theragran


      Pertinent Labs                             () Na 130


     Nutritional Hx/Data


      Height                                     1.73 m


      Height (Calculated Centimeters)            172.7


      Current Weight (lbs)                       78.925 kg


      Weight (Calculated Kilograms)              78.9


      Weight (Calculated Grams)                  49363.1


      Ideal Body Weight                          140


      % Ideal Body Weight                        124


      Body Mass Index (BMI)                      26.4


      Recent Weight Change                       No


      Weight Status                              Overweight


     GI Symptoms


      GI Symptoms                                None


      Last BM                                    7/18 x 1


      Difficult in:                              None


      Food Allergies                             No


      Cultural/Ethnic/Temple Belief           none indicated


      Usual diet at home                         unknown


      Skin Integrity/Comment:                    Josse 21, intact, dryness


      Current %PO                                Good (%)


     Estimated Nutritional Goals


      BEE in Kcals:                              Using Current wt


      Calories/Kcals/Kg                          79kg CBW 25-30 kcal/kg


      Kcals Calculated                           ~7116-3694 kcal/day


      Protein:                                   Using Current wt


      Protein g/k.8-1 gm/kg


      Protein Calculated                         ~65-80 gm/day


      Fluid: ml                                  ~9919-6537 ml/day (1 ml/kcal)


     Nutritional Problem


      1. Problem


       Problem                                   Altered nutrition related lab


                                                 values related to


       Etiology                                  electrolyte imbalance aeb


       Signs/Symptoms:                           Na 130


     Intervention/Recommendation


      Comments                                   1. Continue 60 gm CCHO, no


                                                 added sodium diet as tolerated


                                                 by patient. If patient


                                                 remains hyponatremic, consider


                                                 adding a fluid restriction.


     Expected Outcomes/Goals


      Expected Outcomes/Goals                    Adequate nutrition to meet >75


                                                 % estimated needs, improved


                                                 labs,  skin remains intact,


                                                 weight maintenance or trend


                                                 toward ideal body weight.


                                                 F/U LR

## 2019-07-21 NOTE — GENERAL PROGRESS NOTE
Subjective





- Review of Systems


Service Date: 19


Subjective: 





resting comfortably


no distress





Objective





- Results


Result Diagrams: 


 19 07:00





 19 07:00


Recent Labs: 


 Laboratory Last Values











WBC  6.5 Th/cmm (4.8-10.8)   19  07:00    


 


RBC  4.56 Mil/cmm (3.80-5.10)   19  07:00    


 


Hgb  13.9 gm/dL (12-16)   19  07:00    


 


Hct  42.4 % (41.0-60)   19  07:00    


 


MCV  92.9 fl ()   19  07:00    


 


MCH  30.4 pg (27.0-31.0)   19  07:00    


 


MCHC Differential  32.7 pg (28.0-36.0)   19  07:00    


 


RDW  12.3 % (11.5-20.0)   19  07:00    


 


Plt Count  315 Th/cmm (150-400)   19  07:00    


 


MPV  7.3 fl  19  07:00    


 


Neutrophils %  57.2 % (40.0-80.0)   19  07:00    


 


Lymphocytes %  30.1 % (20.0-50.0)   19  07:00    


 


Monocytes %  9.9 % (2.0-10.0)   19  07:00    


 


Eosinophils %  2.0 % (0.0-5.0)   19  07:00    


 


Basophils %  0.8 % (0.0-2.0)   19  07:00    


 


Sodium  130 mEq/L (136-145)  L  19  07:00    


 


Potassium  4.1 mEq/L (3.5-5.1)   19  07:00    


 


Chloride  97 mEq/L ()  L  19  07:00    


 


Carbon Dioxide  23.5 mEq/L (21.0-31.0)   19  07:00    


 


Anion Gap  13.6  (7.0-16.0)   19  07:00    


 


BUN  7 mg/dL (7-25)   19  07:00    


 


Creatinine  0.6 mg/dL (0.6-1.2)   19  07:00    


 


Est GFR ( Amer)  > 60.0 ml/min (>90)   19  07:00    


 


Est GFR (Non-Af Amer)  > 60.0 ml/min  19  07:00    


 


BUN/Creatinine Ratio  11.7   19  07:00    


 


Glucose  176 mg/dL ()  H  19  07:00    


 


Calcium  9.8 mg/dL (8.6-10.3)   19  07:00    


 


Total Bilirubin  0.3 mg/dL (0.3-1.0)   19  07:00    


 


AST  19 U/L (13-39)   19  07:00    


 


ALT  19 U/L (7-52)   19  07:00    


 


Alkaline Phosphatase  74 U/L ()   19  07:00    


 


Troponin I  < 0.01 ng/mL (0.01-0.05)  L  19  07:00    


 


Total Protein  7.7 gm/dL (6.0-8.3)   19  07:00    


 


Albumin  4.6 gm/dL (3.7-5.3)   19  07:00    


 


Globulin  3.1 gm/dL  19  07:00    


 


Albumin/Globulin Ratio  1.5  (1.0-1.8)   19  07:00    


 


Triglycerides  114 mg/dL (<150)   19  07:00    


 


Cholesterol  156 mg/dL (<200)   19  07:00    


 


LDL Cholesterol Direct  77 mg/dL ()   19  07:00    


 


HDL Cholesterol  57 mg/dL (23-92)   19  07:00    


 


TSH  1.25 uIU/ml (0.34-5.60)   19  07:00    


 


Serum Pregnancy, Qual  NEGATIVE  (NEGATIVE)   19  07:00    


 


Urine Source  CLEAN C   19  15:15    


 


Urine Color  YELLOW   19  15:15    


 


Urine Clarity  CLEAR  (CLEAR)   19  15:15    


 


Urine pH  6.0  (4.6 - 8.0)   19  15:15    


 


Ur Specific Gravity  <= 1.005  (1.005-1.030)   19  15:15    


 


Urine Protein  NEGATIVE mg/dL (NEGATIVE)   19  15:15    


 


Urine Glucose (UA)  NEGATIVE mg/dL (NEGATIVE)   19  15:15    


 


Urine Ketones  NEGATIVE mg/dL (NEGATIVE)   19  15:15    


 


Urine Blood  NEGATIVE  (NEGATIVE)   19  15:15    


 


Urine Nitrate  NEGATIVE  (NEGATIVE)   19  15:15    


 


Urine Bilirubin  NEGATIVE  (NEGATIVE)   19  15:15    


 


Urine Urobilinogen  0.2 E.U./dL (0.2 - 1.0)   19  15:15    


 


Ur Leukocyte Esterase  NEGATIVE  (NEGATIVE)   19  15:15    


 


Urine RBC  0-2 /hpf (0-5)   19  15:15    


 


Urine WBC  0-2 /hpf (0-5)   19  15:15    


 


Ur Epithelial Cells  FEW /lpf (FEW)   19  15:15    


 


Urine Bacteria  FEW /hpf (NONE SEEN)   19  15:15    


 


Urine Pregnancy Test  NEGATIVE   19  14:15    


 


Salicylates  < 25.0 mg/L (30.0-100.0)  L  19  07:00    


 


Urine Opiates Screen  NEGATIVE  (NEGATIVE)   19  15:15    


 


Urine Methadone Screen  NEGATIVE  (NEGATIVE)   19  15:15    


 


Acetaminophen  < 10.0 ug/mL (10.0-30.0)  L  19  07:00    


 


Ur Barbiturates Screen  NEGATIVE  (NEGATIVE)   19  15:15    


 


Ur Tricyclics Screen  NEGATIVE  (NEGATIVE)   19  15:15    


 


Ur Phencyclidine Scrn  NEGATIVE  (NEGATIVE)   19  15:15    


 


Amphetamines Screen  NEGATIVE  (NEGATIVE)   19  15:15    


 


U Methamphetamines Scrn  NEGATIVE  (NEGATIVE)   19  15:15    


 


U Benzodiazepines Scrn  NEGATIVE  (NEGATIVE)   19  15:15    


 


U Cocaine Metab Screen  NEGATIVE  (NEGATIVE)   19  15:15    


 


U Cannabinoids Screen  NEGATIVE  (NEGATIVE)   19  15:15    


 


Ethyl Alcohol  < 10 mg/dL (0-10)   19  07:00    


 


RPR  NONREACTIVE  (NONREACTIVE)   19  07:00    














- Physical Exam


Vitals and I&O: 


 Vital Signs











Temp  97.6 F   19 06:40


 


Pulse  76   19 06:40


 


Resp  18   19 06:40


 


BP  121/89   19 06:40


 


Pulse Ox  98   19 06:40








 Intake & Output











 19





 18:59 06:59 18:59


 


Intake Total 1320 240 


 


Balance 1320 240 


 


Intake:   


 


  Oral 1080 240 


 


  Other 240  


 


Other:   


 


  # Voids 4 2 


 


  # Bowel Movements 1 0 











Active Medications: 


Current Medications





Acetaminophen (Tylenol)  650 mg PO Q4HR PRN


   PRN Reason: Mild Pain / Temp above 100


   Stop: 19 18:14


Al Hydrox/Mg Hydrox/Simethicone (Maalox)  30 ml PO Q4HR PRN


   PRN Reason: GI DISTRESS


   Stop: 19 18:14


Atorvastatin Calcium (Lipitor)  10 mg PO HS [facility]; Protocol


   Stop: 19 20:59


   Last Admin: 19 21:02 Dose:  10 mg


Carbamazepine (Tegretol)  200 mg PO TID [facility]; Protocol


   Stop: 19 04:59


   Last Admin: 19 09:19 Dose:  200 mg


Docusate Sodium (Colace)  250 mg PO BID [facility]


   Stop: 09/15/19 08:59


   Last Admin: 19 09:19 Dose:  250 mg


Fluphenazine HCl (Prolixin)  5 mg PO DAILY [facility]; Protocol


   Stop: 09/15/19 08:59


   Last Admin: 19 09:20 Dose:  5 mg


Glipizide (Glucotrol)  5 mg PO BID [facility]


   Stop: 09/15/19 08:59


   Last Admin: 19 09:20 Dose:  5 mg


Lorazepam (Ativan)  0.5 mg PO Q4HR PRN; Protocol


   PRN Reason: Agitation


   Stop: 08/15/19 18:14


Magnesium Hydroxide (Milk Of Magnesia)  30 ml PO HS PRN


   PRN Reason: Constipation


Metformin HCl (Glucophage)  1,000 mg PO BID [facility]


   Stop: 09/15/19 08:59


   Last Admin: 19 09:21 Dose:  1,000 mg


Miscellaneous (Clinical Monitoring)  1 ea MC DAILY PRN


   PRN Reason: RENAL DOSING (METFORMIN)


   Stop: 09/15/19 07:34


Multivitamins/Vitamin C (Theragran)  1 tab PO DAILY [facility]


   Stop: 09/15/19 08:59


   Last Admin: 19 09:21 Dose:  1 tab


Olanzapine (Zyprexa)  10 mg PO DAILY [facility]; Protocol


   Stop: 09/15/19 08:59


   Last Admin: 19 09:18 Dose:  10 mg


Olanzapine (Zyprexa)  15 mg PO HS [facility]; Protocol


   Stop: 19 20:59


   Last Admin: 19 21:03 Dose:  15 mg


Zolpidem Tartrate (Ambien)  5 mg PO HS PRN


   PRN Reason: Insomnia


   Stop: 19 18:14


   Last Admin: 19 21:02 Dose:  5 mg








General: No acute distress


HEENT: PERRLA


Neck: Supple, JVD


Cardiovascular: Regular rate, Normal S1, Normal S2


Lungs: Clear to auscultation


Abdomen: Bowel sounds, Soft





Assessment/Plan





- Assessment


Assessment: 





1.DM.


2.HYPERLIPIDEMIA.


3.CONSTIPATION.


4.PSYCHOSIS





- Plan


Plan: 





continue current treatment





Nutritional Asmnt/Malnutr-PDOC





- Dietary Evaluation


Malnutrition Findings (Please click <Entered> for more info): 








Nutritional Asmnt/Malnutrition                             Start:  19 11:

53


Text:                                                      Status: Complete    

  


Freq:                                                                          

  


Protocol:                                                                      

  


 Document     19 11:53  MMULN  (Rec: 19 12:13  MMULALEXIS DRIVER-

FNS1)


 Nutritional Asmnt/Malnutrition


     Patient General Information


      Nutritional Screening                      Moderate Risk


      Diagnosis                                  Psychosis


      Pertinent Medical Hx/Surgical Hx           Diabetes, Hyperlipidemia,


                                                 psychosis, chronic


                                                 conistipation


      Subjective Information                     [facility]


                                                 [facility]. Patient asleep in


                                                 bed at time of visit.


                                                 Tolerating current diet


                                                 without difficulty.


      Current Diet Order/ Nutrition Support      60gm CCHO, no added sodium


      Patient / S.O                              Not Indicated


      Pertinent Medications                      Maalox, Lipitor, Colace,


                                                 Glipizide, MOM, Metformin,


                                                 Theragran


      Pertinent Labs                             () Na 130


     Nutritional Hx/Data


      Height                                     1.73 m


      Height (Calculated Centimeters)            172.7


      Current Weight (lbs)                       78.925 kg


      Weight (Calculated Kilograms)              78.9


      Weight (Calculated Grams)                  97517.1


      Ideal Body Weight                          140


      % Ideal Body Weight                        124


      Body Mass Index (BMI)                      26.4


      Recent Weight Change                       No


      Weight Status                              Overweight


     GI Symptoms


      GI Symptoms                                None


      Last BM                                    7/18 x 1


      Difficult in:                              None


      Food Allergies                             No


      Cultural/Ethnic/Tenriism Belief           none indicated


      Usual diet at home                         unknown


      Skin Integrity/Comment:                    Josse 21, intact, dryness


      Current %PO                                Good (%)


     Estimated Nutritional Goals


      BEE in Kcals:                              Using Current wt


      Calories/Kcals/Kg                          79kg CBW 25-30 kcal/kg


      Kcals Calculated                           ~2952-9133 kcal/day


      Protein:                                   Using Current wt


      Protein g/k.8-1 gm/kg


      Protein Calculated                         ~65-80 gm/day


      Fluid: ml                                  ~3912-9378 ml/day (1 ml/kcal)


     Nutritional Problem


      1. Problem


       Problem                                   Altered nutrition related lab


                                                 values related to


       Etiology                                  electrolyte imbalance aeb


       Signs/Symptoms:                           Na 130


     Intervention/Recommendation


      Comments                                   1. Continue 60 gm CCHO, no


                                                 added sodium diet as tolerated


                                                 by patient. If patient


                                                 remains hyponatremic, consider


                                                 adding a fluid restriction.


     Expected Outcomes/Goals


      Expected Outcomes/Goals                    Adequate nutrition to meet >75


                                                 % estimated needs, improved


                                                 labs,  skin remains intact,


                                                 weight maintenance or trend


                                                 toward ideal body weight.


                                                 F/U LR

## 2019-07-21 NOTE — PROGRESS NOTES
DATE:  07/20/2019



PSYCHIATRIC FOLLOWUP NOTE



SUBJECTIVE:  The patient was seen and evaluated.  The patient's chart reviewed.



I am covering for Dr. Sotelo.



IDENTIFYING DATA:  She is a 48-year-old female who lives in [facility].  She was brought in here for aggressive, agitated state,

screaming, and trying to strike out at staff.



Medication reconciliation reviewed.  Currently on Prolixin 5 mg p.o. daily,

glipizide, metformin, olanzapine 15 mg at bedtime and 10 mg ____ Zyprexa.



Today on face-to-face evaluation, the patient is calm in her room, does not know

exactly where she is.  At times difficult to understand her conversation.  No

EPS.  No tardive dyskinesia.



ASSESSMENT AND PLAN:  History of severe schizophrenia with ____ body changes

____, making nonsensical statements, consistent with her disorganized thought

process.  We will continue with primary psychiatrist's treatment plan and goals

to further assess the agitated behavior.





DD: 07/20/2019 15:55

DT: 07/21/2019 16:28

JOB# [id number]  [id number]

## 2019-07-22 NOTE — INTERNAL MEDICINE PROG NOTE
Internal Medicine Subjective





- Subjective


Service Date: 19


Patient seen and examined:: with staff (SHE IS DOING BETTER)


Patient is:: awake, verbal, in bed, talking, confused


Per staff patient has:: no adverse event





Internal Medicine Objective





- Results


Result Diagrams: 


 19 07:00





 19 07:00


Recent Labs: 


 Laboratory Last Values











WBC  6.5 Th/cmm (4.8-10.8)   19  07:00    


 


RBC  4.56 Mil/cmm (3.80-5.10)   19  07:00    


 


Hgb  13.9 gm/dL (12-16)   19  07:00    


 


Hct  42.4 % (41.0-60)   19  07:00    


 


MCV  92.9 fl ()   19  07:00    


 


MCH  30.4 pg (27.0-31.0)   19  07:00    


 


MCHC Differential  32.7 pg (28.0-36.0)   19  07:00    


 


RDW  12.3 % (11.5-20.0)   19  07:00    


 


Plt Count  315 Th/cmm (150-400)   19  07:00    


 


MPV  7.3 fl  19  07:00    


 


Neutrophils %  57.2 % (40.0-80.0)   19  07:00    


 


Lymphocytes %  30.1 % (20.0-50.0)   19  07:00    


 


Monocytes %  9.9 % (2.0-10.0)   19  07:00    


 


Eosinophils %  2.0 % (0.0-5.0)   19  07:00    


 


Basophils %  0.8 % (0.0-2.0)   19  07:00    


 


Sodium  130 mEq/L (136-145)  L  19  07:00    


 


Potassium  4.1 mEq/L (3.5-5.1)   19  07:00    


 


Chloride  97 mEq/L ()  L  19  07:00    


 


Carbon Dioxide  23.5 mEq/L (21.0-31.0)   19  07:00    


 


Anion Gap  13.6  (7.0-16.0)   19  07:00    


 


BUN  7 mg/dL (7-25)   19  07:00    


 


Creatinine  0.6 mg/dL (0.6-1.2)   19  07:00    


 


Est GFR ( Amer)  > 60.0 ml/min (>90)   19  07:00    


 


Est GFR (Non-Af Amer)  > 60.0 ml/min  19  07:00    


 


BUN/Creatinine Ratio  11.7   19  07:00    


 


Glucose  176 mg/dL ()  H  19  07:00    


 


Calcium  9.8 mg/dL (8.6-10.3)   19  07:00    


 


Total Bilirubin  0.3 mg/dL (0.3-1.0)   19  07:00    


 


AST  19 U/L (13-39)   19  07:00    


 


ALT  19 U/L (7-52)   19  07:00    


 


Alkaline Phosphatase  74 U/L ()   19  07:00    


 


Troponin I  < 0.01 ng/mL (0.01-0.05)  L  19  07:00    


 


Total Protein  7.7 gm/dL (6.0-8.3)   19  07:00    


 


Albumin  4.6 gm/dL (3.7-5.3)   19  07:00    


 


Globulin  3.1 gm/dL  19  07:00    


 


Albumin/Globulin Ratio  1.5  (1.0-1.8)   19  07:00    


 


Triglycerides  114 mg/dL (<150)   19  07:00    


 


Cholesterol  156 mg/dL (<200)   19  07:00    


 


LDL Cholesterol Direct  77 mg/dL ()   19  07:00    


 


HDL Cholesterol  57 mg/dL (23-92)   19  07:00    


 


TSH  1.25 uIU/ml (0.34-5.60)   19  07:00    


 


Serum Pregnancy, Qual  NEGATIVE  (NEGATIVE)   19  07:00    


 


Urine Source  CLEAN C   19  15:15    


 


Urine Color  YELLOW   19  15:15    


 


Urine Clarity  CLEAR  (CLEAR)   19  15:15    


 


Urine pH  6.0  (4.6 - 8.0)   19  15:15    


 


Ur Specific Gravity  <= 1.005  (1.005-1.030)   19  15:15    


 


Urine Protein  NEGATIVE mg/dL (NEGATIVE)   19  15:15    


 


Urine Glucose (UA)  NEGATIVE mg/dL (NEGATIVE)   19  15:15    


 


Urine Ketones  NEGATIVE mg/dL (NEGATIVE)   19  15:15    


 


Urine Blood  NEGATIVE  (NEGATIVE)   19  15:15    


 


Urine Nitrate  NEGATIVE  (NEGATIVE)   19  15:15    


 


Urine Bilirubin  NEGATIVE  (NEGATIVE)   19  15:15    


 


Urine Urobilinogen  0.2 E.U./dL (0.2 - 1.0)   19  15:15    


 


Ur Leukocyte Esterase  NEGATIVE  (NEGATIVE)   19  15:15    


 


Urine RBC  0-2 /hpf (0-5)   19  15:15    


 


Urine WBC  0-2 /hpf (0-5)   19  15:15    


 


Ur Epithelial Cells  FEW /lpf (FEW)   19  15:15    


 


Urine Bacteria  FEW /hpf (NONE SEEN)   19  15:15    


 


Urine Pregnancy Test  NEGATIVE   19  14:15    


 


Salicylates  < 25.0 mg/L (30.0-100.0)  L  19  07:00    


 


Urine Opiates Screen  NEGATIVE  (NEGATIVE)   19  15:15    


 


Urine Methadone Screen  NEGATIVE  (NEGATIVE)   19  15:15    


 


Acetaminophen  < 10.0 ug/mL (10.0-30.0)  L  19  07:00    


 


Ur Barbiturates Screen  NEGATIVE  (NEGATIVE)   19  15:15    


 


Ur Tricyclics Screen  NEGATIVE  (NEGATIVE)   19  15:15    


 


Ur Phencyclidine Scrn  NEGATIVE  (NEGATIVE)   19  15:15    


 


Amphetamines Screen  NEGATIVE  (NEGATIVE)   19  15:15    


 


U Methamphetamines Scrn  NEGATIVE  (NEGATIVE)   19  15:15    


 


U Benzodiazepines Scrn  NEGATIVE  (NEGATIVE)   19  15:15    


 


U Cocaine Metab Screen  NEGATIVE  (NEGATIVE)   19  15:15    


 


U Cannabinoids Screen  NEGATIVE  (NEGATIVE)   19  15:15    


 


Ethyl Alcohol  < 10 mg/dL (0-10)   19  07:00    


 


RPR  NONREACTIVE  (NONREACTIVE)   19  07:00    














- Physical Exam


Vitals and I&O: 


 Vital Signs











Temp  0 F   19 20:01


 


Pulse  76   19 14:45


 


Resp  18   19 14:45


 


BP  128/78   19 14:45


 


Pulse Ox  96   19 14:45








 Intake & Output











 19





 06:59 18:59 06:59


 


Intake Total   240


 


Balance   240


 


Weight (lbs)   78.925 kg


 


Intake:   


 


  Oral   240


 


Other:   


 


  # Voids   3


 


  # Bowel Movements  1 0


 


  Stool Characteristics Formed Formed Formed


 


  Weight Source   Bedscale











Active Medications: 


Current Medications





Acetaminophen (Tylenol)  650 mg PO Q4HR PRN


   PRN Reason: Mild Pain / Temp above 100


   Stop: 19 18:14


Al Hydrox/Mg Hydrox/Simethicone (Maalox)  30 ml PO Q4HR PRN


   PRN Reason: GI DISTRESS


   Stop: 19 18:14


Atorvastatin Calcium (Lipitor)  10 mg PO HS [facility]; Protocol


   Stop: 19 20:59


   Last Admin: 19 21:11 Dose:  10 mg


Carbamazepine (Tegretol)  200 mg PO TID [facility]; Protocol


   Stop: 19 04:59


   Last Admin: 19 13:52 Dose:  200 mg


Docusate Sodium (Colace)  250 mg PO BID [facility]


   Stop: 09/15/19 08:59


   Last Admin: 19 17:38 Dose:  250 mg


Fluphenazine HCl (Prolixin)  5 mg PO DAILY [facility]; Protocol


   Stop: 09/15/19 08:59


   Last Admin: 19 09:51 Dose:  5 mg


Glipizide (Glucotrol)  5 mg PO BID [facility]


   Stop: 09/15/19 08:59


   Last Admin: 19 17:38 Dose:  5 mg


Lorazepam (Ativan)  0.5 mg PO Q4HR PRN; Protocol


   PRN Reason: Agitation


   Stop: 08/15/19 18:14


   Last Admin: 19 01:11 Dose:  0.5 mg


Magnesium Hydroxide (Milk Of Magnesia)  30 ml PO HS PRN


   PRN Reason: Constipation


Metformin HCl (Glucophage)  1,000 mg PO BID [facility]


   Stop: 09/15/19 08:59


   Last Admin: 19 17:38 Dose:  1,000 mg


Miscellaneous (Clinical Monitoring)  1 ea [facility] DAILY PRN


   PRN Reason: RENAL DOSING (METFORMIN)


   Stop: 09/15/19 07:34


Multivitamins/Vitamin C (Theragran)  1 tab PO DAILY [facility]


   Stop: 09/15/19 08:59


   Last Admin: 19 09:51 Dose:  1 tab


Olanzapine (Zyprexa)  10 mg PO DAILY [facility]; Protocol


   Stop: 09/15/19 08:59


   Last Admin: 19 09:51 Dose:  10 mg


Olanzapine (Zyprexa)  15 mg PO HS [facility]; Protocol


   Stop: 19 20:59


   Last Admin: 19 21:53 Dose:  15 mg


Zolpidem Tartrate (Ambien)  5 mg PO HS PRN


   PRN Reason: Insomnia


   Stop: 19 18:14


   Last Admin: 19 21:12 Dose:  5 mg








General: demented


HEENT: NC/AT, PERRLA, EOMI, anicteric sclerae, throat clear


Neck: Supple, No JVD, No thyromegaly, +2 carotid pulse wo bruit, No LAD


Abdomen: soft, non-tender, non-distended


Extremities: clear


Neurological: no change





Internal Medicine Assmt/Plan





- Assessment


Assessment: 





1.DM.


2.HYPERLIPIDEMIA.


3.CONSTIPATION.


4.PSYCHOSIS





- Plan


Plan: 





CONTINUE ON CURRENT MEDICATION AND DIET





Nutritional Asmnt/Malnutr-PDOC





- Dietary Evaluation


Malnutrition Findings (Please click <Entered> for more info): 








Nutritional Asmnt/Malnutrition                             Start:  19 11:

53


Text:                                                      Status: Complete    

  


Freq:                                                                          

  


Protocol:                                                                      

  


 Document     19 11:53  STEVIE  (Rec: 19 12:13  STEVIE DRIVER-

FNS1)


 Nutritional Asmnt/Malnutrition


     Patient General Information


      Nutritional Screening                      Moderate Risk


      Diagnosis                                  Psychosis


      Pertinent Medical Hx/Surgical Hx           Diabetes, Hyperlipidemia,


                                                 psychosis, chronic


                                                 conistipation


      Subjective Information                     [facility]


                                                 [facility]


                                                 [facility]. Patient asleep in


                                                 bed at time of visit.


                                                 Tolerating current diet


                                                 without difficulty.


      Current Diet Order/ Nutrition Support      60gm CCHO, no added sodium


      Patient / S.O                              Not Indicated


      Pertinent Medications                      Maalox, Lipitor, Colace,


                                                 Glipizide, MOM, Metformin,


                                                 Theragran


      Pertinent Labs                             () Na 130


     Nutritional Hx/Data


      Height                                     1.73 m


      Height (Calculated Centimeters)            172.7


      Current Weight (lbs)                       78.925 kg


      Weight (Calculated Kilograms)              78.9


      Weight (Calculated Grams)                  67631.1


      Ideal Body Weight                          140


      % Ideal Body Weight                        124


      Body Mass Index (BMI)                      26.4


      Recent Weight Change                       No


      Weight Status                              Overweight


     GI Symptoms


      GI Symptoms                                None


      Last BM                                    7/18 x 1


      Difficult in:                              None


      Food Allergies                             No


      Cultural/Ethnic/Christianity Belief           none indicated


      Usual diet at home                         unknown


      Skin Integrity/Comment:                    Josse 21, intact, dryness


      Current %PO                                Good (%)


     Estimated Nutritional Goals


      BEE in Kcals:                              Using Current wt


      Calories/Kcals/Kg                          79kg CBW 25-30 kcal/kg


      Kcals Calculated                           ~0557-0928 kcal/day


      Protein:                                   Using Current wt


      Protein g/k.8-1 gm/kg


      Protein Calculated                         ~65-80 gm/day


      Fluid: ml                                  ~9226-2863 ml/day (1 ml/kcal)


     Nutritional Problem


      1. Problem


       Problem                                   Altered nutrition related lab


                                                 values related to


       Etiology                                  electrolyte imbalance aeb


       Signs/Symptoms:                           Na 130


     Intervention/Recommendation


      Comments                                   1. Continue 60 gm CCHO, no


                                                 added sodium diet as tolerated


                                                 by patient. If patient


                                                 remains hyponatremic, consider


                                                 adding a fluid restriction.


     Expected Outcomes/Goals


      Expected Outcomes/Goals                    Adequate nutrition to meet >75


                                                 % estimated needs, improved


                                                 labs,  skin remains intact,


                                                 weight maintenance or trend


                                                 toward ideal body weight.


                                                 F/U LR

## 2019-07-22 NOTE — PROGRESS NOTES
DATE:  07/21/2019



SUBJECTIVE:  The patient was seen and evaluated.  The patient's chart was

reviewed.



This is Dr. Merino covering for Dr. Tinajero



The patient ____.  Denies any side effects to medication.  She slept about 6

hours last night.  She continues to perseverate about major trauma and spiritual

infection, some body changes, and disorganized, needing redirection.



MENTAL STATUS EXAMINATION:  Still disorganized, making statements that are

incoherent ____.



ASSESSMENT AND PLAN:  Due to the patient's ongoing disorganized thought process,

unable to formulate a safe plan.  We will continue with the primary psychiatrist

treatment plan and goals with medication, which includes Tegretol and

olanzapine.





DD: 07/21/2019 06:40

DT: 07/21/2019 22:41

[facility]# [id number]  [id number]

## 2019-07-22 NOTE — PROGRESS NOTES
DATE:  07/22/2019



SUBJECTIVE:  A 48-year-old female living in [facility] and

transferred to [facility] due to increased yelling and screaming

episodes.  The patient is very aggressive, agitated, attempting to strike out at

staff.  The patient was making some bizarre statements.  When I saw her last

week, seems to be somewhat calmer, but impulsive, unpredictable, still talking

about psychotrauma and spiritual incantations.  Stating that she wants to go to

a board and care, but at the same time stating that she has a home in [place].

 Dr. Merino saw the patient yesterday.  The patient was still talking about

spiritual incantations, body changes, nonsensical statement, ongoing psychotic

symptoms.



PLAN:  We will continue to monitor closely.  Continue to titrate medications.





DD: 07/22/2019 11:37

DT: 07/22/2019 23:02

JOB# [id number]  [id number]

## 2019-07-24 NOTE — PROGRESS NOTES
DATE:  07/23/2019



The patient noted to be calm today.  No events, no agitation.  Seems to be

getting along fairly well with the staff, peers.  Wants to leave.  Isolative and

withdrawn.  No agitation, no escalation of behaviors.  No SI, no HI.  No overt

psychotic symptoms noted, seems to be generally approaching her baseline.  Doing

well on increased doses of Zyprexa.  Calm on exam.  Denying any SI.  No overt

____ disturbances.  We will attempt to discharge the patient today.





DD: 07/23/2019 14:28

DT: 07/23/2019 22:00

JOB# [id number]  [id number]

## 2020-03-10 ENCOUNTER — HOSPITAL ENCOUNTER (INPATIENT)
Dept: HOSPITAL 36 - GERO | Age: 50
LOS: 17 days | Discharge: NURSING, REHAB OR LONG-TERM CARE | DRG: 885 | End: 2020-03-27
Attending: PSYCHIATRY & NEUROLOGY | Admitting: PSYCHIATRY & NEUROLOGY
Payer: MEDICARE

## 2020-03-10 ENCOUNTER — HOSPITAL ENCOUNTER (EMERGENCY)
Dept: HOSPITAL 4 - SED | Age: 50
Discharge: PSYCHIATRIC OR SUBSTANCE USE FACILITY | End: 2020-03-10
Payer: COMMERCIAL

## 2020-03-10 VITALS — BODY MASS INDEX: 28.32 KG/M2 | HEIGHT: 65 IN | WEIGHT: 170 LBS

## 2020-03-10 VITALS — SYSTOLIC BLOOD PRESSURE: 155 MMHG | DIASTOLIC BLOOD PRESSURE: 76 MMHG

## 2020-03-10 VITALS — SYSTOLIC BLOOD PRESSURE: 124 MMHG

## 2020-03-10 VITALS — SYSTOLIC BLOOD PRESSURE: 149 MMHG

## 2020-03-10 DIAGNOSIS — E11.9: ICD-10-CM

## 2020-03-10 DIAGNOSIS — Z79.84: ICD-10-CM

## 2020-03-10 DIAGNOSIS — J44.9: ICD-10-CM

## 2020-03-10 DIAGNOSIS — F25.9: ICD-10-CM

## 2020-03-10 DIAGNOSIS — Z59.0: ICD-10-CM

## 2020-03-10 DIAGNOSIS — E78.5: ICD-10-CM

## 2020-03-10 DIAGNOSIS — F25.0: Primary | ICD-10-CM

## 2020-03-10 DIAGNOSIS — Z88.8: ICD-10-CM

## 2020-03-10 DIAGNOSIS — F22: ICD-10-CM

## 2020-03-10 DIAGNOSIS — G40.909: ICD-10-CM

## 2020-03-10 DIAGNOSIS — F29: Primary | ICD-10-CM

## 2020-03-10 DIAGNOSIS — Z79.899: ICD-10-CM

## 2020-03-10 LAB
ALBUMIN SERPL BCP-MCNC: 3.6 G/DL (ref 3.4–4.8)
ALT SERPL W P-5'-P-CCNC: 31 U/L (ref 12–78)
AMPHETAMINES UR QL SCN: NEGATIVE
ANION GAP SERPL CALCULATED.3IONS-SCNC: 9 MMOL/L (ref 5–15)
APAP SERPL-MCNC: < 1 UG/ML (ref 1–30)
APPEARANCE UR: CLEAR
AST SERPL W P-5'-P-CCNC: 21 U/L (ref 10–37)
BARBITURATES UR QL SCN: NEGATIVE
BASOPHILS # BLD AUTO: 0 K/UL (ref 0–0.2)
BASOPHILS NFR BLD AUTO: 0.5 % (ref 0–2)
BENZODIAZ UR QL SCN: NEGATIVE
BILIRUB SERPL-MCNC: 0.2 MG/DL (ref 0–1)
BILIRUB UR QL STRIP: NEGATIVE
BUN SERPL-MCNC: 8 MG/DL (ref 8–21)
BZE UR QL SCN: NEGATIVE
CALCIUM SERPL-MCNC: 8.1 MG/DL (ref 8.4–11)
CANNABINOIDS UR QL SCN: NEGATIVE
CHLORIDE SERPL-SCNC: 91 MMOL/L (ref 98–107)
CHOLEST SERPL-MCNC: 169 MG/DL (ref ?–200)
COLOR UR: (no result)
CREAT SERPL-MCNC: 0.67 MG/DL (ref 0.55–1.3)
EOSINOPHIL # BLD AUTO: 0.3 K/UL (ref 0–0.4)
EOSINOPHIL NFR BLD AUTO: 2.9 % (ref 0–4)
ERYTHROCYTE [DISTWIDTH] IN BLOOD BY AUTOMATED COUNT: 13.1 % (ref 9–15)
ETHANOL SERPL-MCNC: < 3 MG/DL (ref ?–10)
GFR SERPL CREATININE-BSD FRML MDRD: 119 ML/MIN (ref 90–?)
GLUCOSE SERPL-MCNC: 122 MG/DL (ref 70–99)
GLUCOSE UR STRIP-MCNC: NEGATIVE MG/DL
HCT VFR BLD AUTO: 34.8 % (ref 36–48)
HDLC SERPL-MCNC: 52 MG/DL (ref 55–?)
HGB BLD-MCNC: 11.9 G/DL (ref 12–16)
HGB UR QL STRIP: NEGATIVE
KETONES UR STRIP-MCNC: NEGATIVE MG/DL
LDL CHOLESTEROL: 80 MG/DL (ref ?–100)
LEUKOCYTE ESTERASE UR QL STRIP: NEGATIVE
LYMPHOCYTES # BLD AUTO: 2.7 K/UL (ref 1–5.5)
LYMPHOCYTES NFR BLD AUTO: 28 % (ref 20.5–51.5)
MCH RBC QN AUTO: 30 PG (ref 27–31)
MCHC RBC AUTO-ENTMCNC: 34 % (ref 32–36)
MCV RBC AUTO: 89 FL (ref 79–98)
METHADONE UR-SCNC: NEGATIVE UMOL/L
METHAMPHET UR-SCNC: NEGATIVE UMOL/L
MONOCYTES # BLD MANUAL: 1 K/UL (ref 0–1)
MONOCYTES # BLD MANUAL: 10.1 % (ref 1.7–9.3)
NEUTROPHILS # BLD AUTO: 5.6 K/UL (ref 1.8–7.7)
NEUTROPHILS NFR BLD AUTO: 58.5 % (ref 40–70)
NITRITE UR QL STRIP: NEGATIVE
OPIATES UR QL SCN: NEGATIVE
OXYCODONE SERPL-MCNC: NEGATIVE NG/ML
PCP UR QL SCN: NEGATIVE
PH UR STRIP: 6.5 [PH] (ref 5–8)
PLATELET # BLD AUTO: 331 K/UL (ref 130–430)
POTASSIUM SERPL-SCNC: 4 MMOL/L (ref 3.5–5.1)
PROT UR QL STRIP: NEGATIVE
RBC # BLD AUTO: 3.93 MIL/UL (ref 4.2–6.2)
SODIUM SERPLBLD-SCNC: 124 MMOL/L (ref 136–145)
SP GR UR STRIP: <1.005 (ref 1–1.03)
TRICYCLICS UR-MCNC: NEGATIVE NG/ML
TRIGL SERPL-MCNC: 256 MG/DL (ref 30–150)
URINE PROPOXYPHENE SCREEN: NEGATIVE
UROBILINOGEN UR STRIP-MCNC: 0.2 MG/DL (ref 0.2–1)
WBC # BLD AUTO: 9.5 K/UL (ref 4.8–10.8)

## 2020-03-10 PROCEDURE — G0410 GRP PSYCH PARTIAL HOSP 45-50: HCPCS

## 2020-03-10 PROCEDURE — 36415 COLL VENOUS BLD VENIPUNCTURE: CPT

## 2020-03-10 PROCEDURE — 80061 LIPID PANEL: CPT

## 2020-03-10 PROCEDURE — 85025 COMPLETE CBC W/AUTO DIFF WBC: CPT

## 2020-03-10 PROCEDURE — 99285 EMERGENCY DEPT VISIT HI MDM: CPT

## 2020-03-10 PROCEDURE — G0480 DRUG TEST DEF 1-7 CLASSES: HCPCS

## 2020-03-10 PROCEDURE — 83036 HEMOGLOBIN GLYCOSYLATED A1C: CPT

## 2020-03-10 PROCEDURE — 80307 DRUG TEST PRSMV CHEM ANLYZR: CPT

## 2020-03-10 PROCEDURE — Z7610: HCPCS

## 2020-03-10 PROCEDURE — 87081 CULTURE SCREEN ONLY: CPT

## 2020-03-10 PROCEDURE — 81003 URINALYSIS AUTO W/O SCOPE: CPT

## 2020-03-10 PROCEDURE — G0481 DRUG TEST DEF 8-14 CLASSES: HCPCS

## 2020-03-10 PROCEDURE — G0482 DRUG TEST DEF 15-21 CLASSES: HCPCS

## 2020-03-10 PROCEDURE — 80053 COMPREHEN METABOLIC PANEL: CPT

## 2020-03-10 SDOH — ECONOMIC STABILITY - HOUSING INSECURITY: HOMELESSNESS: Z59.0

## 2020-03-10 NOTE — NUR
PAtient brought in BLS from [facility] for medical clearnce prior 
to transfer to [facility].   Patient sent for increase agitation, non 
compliant with medication, increased aggressive, increased delusions and 
confusions.   Accepting Psychiatrist Leidy.    Pain 0/10.   Patient will go 
to room 58 B.

## 2020-03-10 NOTE — NUR
Patient to be transferred to [facility] Rom 58 B .  Is being transferred due 
to higher level of care.  Receiving facility has accepting physician and 
available space. ER physician has signed transfer form.  Patient or responsible 
party has agreed to transfer and signed form.  Patient belongings inventoried 
and will be sent with patient.  Copy of nursing notes, lab reports, EKG, 
Physicians Orders and X-rays to be sent with patient.  Report called to  at 
receiving facility. Receiving physician is Dr. Forbes. [facility] ambulance service 
has been called for transfer.

## 2020-03-10 NOTE — NUR
Patient triaged and placed in amb1. VSS and patient appears in no acute 
distress at this time. Accompanied by EMS, awaiting available bed, and MD 
notified of need for MSE.

## 2020-03-11 NOTE — HISTORY & PHYSICAL
ADMIT DATE:  03/10/2020



HISTORY OF PRESENT ILLNESS:  The patient is a 49-year-old female with long

history of diabetes mellitus, seizure disorder, hyperlipidemia, schizoaffective

disorder, admitted to [facility] under Dr. Forbes's service.  The

patient feels well, no chest pain, no shortness of breath, no nausea, no

vomiting.



PAST MEDICAL HISTORY:  Significant for diabetes mellitus, seizure disorder,

hyperlipidemia, schizoaffective disorder.



PAST SURGICAL HISTORY:  No recent surgery.



ALLERGIES:  She is allergic to CLONAZEPAM, HALOPERIDOL and MELOXICAM.



SOCIAL HISTORY:  No smoking, no alcohol, no drug.



FAMILY HISTORY:  Noncontributory.



MEDICATIONS:  Follow admission reconciliation.



REVIEW OF SYSTEMS:

RENAL SYSTEM:  No history of chronic renal disorder.

CARDIOVASCULAR SYSTEM:  No coronary artery disease.

ENDOCRINE SYSTEM:  She has history of diabetes mellitus.

GASTROINTESTINAL SYSTEM:  No upper or lower GI bleeding.

NEUROLOGICAL:  She has history of seizure disorder.

SKELETOMUSCULAR SYSTEM:  No muscular dystrophy.

HEMATOLOGICAL SYSTEM:  No bleeding tendencies.

RESPIRATORY SYSTEM:  No asthma.

GENITOURINARY:  No dysuria or hematuria.



PHYSICAL EXAMINATION:

GENERAL:  She is awake, alert, confused.

VITAL SIGNS:  Temperature 97.6, heart rate 85, blood pressure 101/56.

HEENT:  Normocephalic.  Pupils reacting equal to light and accommodation. 

Sclerae clear.

NECK:  Supple.  Negative for lymphadenopathy, JVD or bruit.

CHEST:  Bilaterally normal.  No rhonchi or wheezing.

HEART:  S1, S2 normal.  No murmur or gallop rhythm.

ABDOMEN:  Soft, bowel sounds positive.

EXTREMITIES:  No edema.

NEUROLOGIC:  She is awake, alert, confused.

Cranial nerve 1:  The patient was not able to differentiate the odor.

Cranial nerve 2:  The patient is able to read printed page.

Cranial nerve 3:  The patient is able to move eyeball upward and outward.

Cranial nerve 4:  The patient is able to move eyeball inward and downward.

Cranial nerve 5:  The patient to clench teeth, has normal sensation to forehead.

Cranial nerve 6:  The patient is able to move eyeball lateral on both sides.

Cranial nerve 7:  The patient is able to move eyebrow upwards on both sides.

Cranial nerve 8:  The patient will hear finger rubs on both sides.

Cranial nerve 9:  The patient has normal gag reflex.

Cranial nerve 10:  The patient able to move soft palate upward on both sides.

Cranial nerve 11:  The patient able to shrink shoulder on both sides.

Cranial nerve 12:  The patient able to stick tongue straight.

Motor or sensory deficit ____.  Her gait is stable.  Romberg test is normal. 

Muscle tone is normal.  Deep tendon reflexes within normal.  Finger-to-nose is

normal.  Sensory system is normal.

SKIN:  Intact.



ASSESSMENT:

1.  Diabetes mellitus.

2.  Hyperlipidemia.

3.  Seizure disorder.

4.  Schizoaffective disorder.



PLAN:  The patient in the hospital under Dr. Forbes's service.  Medical

problems addressed during hospitalization are schizoaffective disorder.  Problem

addressed at discharge are diabetes mellitus, seizure disorder, hyperlipidemia. 

The patient is medically stable for activity.



Thank you, Dr. Forbes, for asking me to see your patient.  The patient is a

FULL CODE.





DD: 03/11/2020 20:33

DT: 03/11/2020 21:11

JOB# [id number]  [id number]

## 2020-03-12 NOTE — INTERNAL MEDICINE PROG NOTE
Internal Medicine Subjective





- Subjective


Service Date: 03/12/20


Patient seen and examined:: with staff (SHE IS DOING WELL)


Patient is:: awake, verbal, ambulating, talking, confused


Per staff patient has:: no adverse event, eating well, confused, tolerating meds





Internal Medicine Objective





- Physical Exam


Vitals and I&O: 


 Vital Signs











Temp  98 F   03/12/20 14:46


 


Pulse  83   03/12/20 14:46


 


Resp  20   03/12/20 14:46


 


BP  120/63   03/12/20 14:46


 


Pulse Ox  99   03/12/20 14:46








 Intake & Output











 03/11/20 03/12/20 03/12/20





 18:59 06:59 18:59


 


Intake Total  240 


 


Balance  240 


 


Intake:   


 


  Oral  240 


 


Other:   


 


  # Voids  2 


 


  # Bowel Movements  0 


 


  Stool Characteristics Formed Formed Formed





 Brown Brown Brown











Active Medications: 


Current Medications





Acetaminophen (Tylenol)  650 mg PO Q4H PRN


   PRN Reason: Pain (Mild 1-3)


   Stop: 05/09/20 21:58


Acetaminophen (Tylenol)  650 mg PO Q4HR PRN


   PRN Reason: Temp above 100


   Stop: 05/10/20 00:03


Al Hydrox/Mg Hydrox/Simethicone (Maalox)  30 ml PO Q4HR PRN


   PRN Reason: GI DISTRESS


   Stop: 05/10/20 00:03


Atorvastatin Calcium (Lipitor)  10 mg PO HS [facility]; Protocol


   Stop: 05/10/20 20:59


   Last Admin: 03/11/20 20:41 Dose:  10 mg


Carbamazepine (Tegretol)  200 mg PO TID [facility]; Protocol


   Stop: 05/10/20 08:59


   Last Admin: 03/12/20 14:35 Dose:  200 mg


Docusate Sodium (Colace)  100 mg PO BID [facility]


   Stop: 05/10/20 08:59


   Last Admin: 03/12/20 16:12 Dose:  100 mg


Fluphenazine HCl (Prolixin)  2.5 mg PO DAILY [facility]; Protocol


   Stop: 05/10/20 08:59


   Last Admin: 03/12/20 08:13 Dose:  2.5 mg


Glipizide (Glucotrol)  5 mg PO BID [facility]


   Stop: 05/10/20 08:59


   Last Admin: 03/12/20 16:12 Dose:  5 mg


Lorazepam (Ativan)  0.5 mg PO Q4HR PRN; Protocol


   PRN Reason: Anxiety


   Stop: 04/09/20 21:58


Magnesium Hydroxide (Milk Of Magnesia)  30 ml PO HS PRN


   PRN Reason: Constipation


   Stop: 05/10/20 00:03


Metformin HCl (Glucophage)  1,000 mg PO BID [facility]


   Stop: 05/10/20 08:59


   Last Admin: 03/12/20 16:12 Dose:  1,000 mg


Multivitamins/Vitamin C (Theragran)  1 tab PO DAILY [facility]


   Stop: 05/10/20 08:59


   Last Admin: 03/12/20 08:13 Dose:  1 tab


Olanzapine 10 mg/ Olanzapine 2 (.5 mg)  12.5 mg PO BID [facility]


   Stop: 05/10/20 16:59


   Last Admin: 03/12/20 16:13 Dose:  12.5 mg


Zolpidem Tartrate (Ambien)  5 mg PO HS PRN


   PRN Reason: Insomnia


   Stop: 05/09/20 21:58








General: alert, demented


HEENT: NC/AT, PERRLA, EOMI, anicteric sclerae, throat clear


Neck: Supple, No JVD, No thyromegaly, +2 carotid pulse wo bruit, No LAD


Lungs: CTAB


Cardiovascular: Normal S1, Normal S2, without murmur


Abdomen: soft, non-tender, non-distended


Extremities: clear


Neurological: no change, gait stable





Internal Medicine Assmt/Plan





- Assessment


Assessment: 





1.DM.


2.HYPERLIPIDEMIA.


3.SEIZURE DISORDER


4.SCHIZO AFFECTIVE DISORDERS





- Plan


Plan: 





CONTINUE ON CURRENT MEDICATION AND DIET

## 2020-03-12 NOTE — PROGRESS NOTES
DATE:     03/12/2020



Case was discussed with staff of the patient, reviewed records.  The patient

continues to be paranoid and delusional, continues to be easily agitated, 
paranoid 

somebody is trying to kill her.  Continues to have poor insight.  Continues to

be unable to make safe plan for self-care.  The patient is conserved.  The

patient also is allergic to CLONAZEPAM, beside HALDOL and MELOXICAM and no side

effects with the medication, no sedation, no nausea, no extrapyramidal symptoms,

was restarted on the olanzapine 12.5 mg twice a day with no side effects.  She

is also on fluphenazine 2.5 mg daily and we will continue outpatient group

therapy and Tegretol 200 mg 3 times a day, glipizide, atorvastatin.  We will

continue outpatient group therapy, milieu therapy, and adjust medications as

needed.





DD: 03/12/2020 10:38

DT: 03/12/2020 12:30

JOB# [id number]  [id number]

MTDD

## 2020-03-13 NOTE — PROGRESS NOTES
DATE:  



Case was discussed with staff of the patient, reviewed records.  The patient

continues to have poor insight in general, confused, unable to make safe plan

for self-care, easily agitated, she is conserved.  No side effects with the

medication, no sedation, no nausea, no extrapyramidal symptoms.  She tend to

pace in the unit, does not talk much to anybody.  No side effects with the

medication, no sedation, no nausea, no extrapyramidal symptoms.  We will

continue outpatient group therapy, milieu therapy, adjust medication as needed.





DD: 03/13/2020 12:37

DT: 03/13/2020 20:50

JOB# [id number]  [id number]

## 2020-03-13 NOTE — INTERNAL MEDICINE PROG NOTE
Internal Medicine Subjective





- Subjective


Service Date: 20


Patient seen and examined:: without staff (SHE IS DOING WELL)


Patient is:: awake, verbal, ambulating, talking, confused


Per staff patient has:: no adverse event, eating well, confused, tolerating meds





Internal Medicine Objective





- Physical Exam


Vitals and I&O: 


 Vital Signs











Temp  97.4 F   20 05:53


 


Pulse  90   20 05:53


 


Resp  17   20 08:00


 


BP  108/58   20 05:53


 


Pulse Ox  95   20 05:53








 Intake & Output











 20





 18:59 06:59 18:59


 


Intake Total  240 


 


Balance  240 


 


Intake:   


 


  Oral  240 


 


Other:   


 


  # Voids  2 


 


  # Bowel Movements  0 


 


  Stool Characteristics Formed  Formed





 Brown  Brown











Active Medications: 


Current Medications





Acetaminophen (Tylenol)  650 mg PO Q4H PRN


   PRN Reason: Pain (Mild 1-3)


   Stop: 20 21:58


Acetaminophen (Tylenol)  650 mg PO Q4HR PRN


   PRN Reason: Temp above 100


   Stop: 05/10/20 00:03


Al Hydrox/Mg Hydrox/Simethicone (Maalox)  30 ml PO Q4HR PRN


   PRN Reason: GI DISTRESS


   Stop: 05/10/20 00:03


Atorvastatin Calcium (Lipitor)  10 mg PO HS [facility]; Protocol


   Stop: 05/10/20 20:59


   Last Admin: 20 21:29 Dose:  10 mg


Carbamazepine (Tegretol)  200 mg PO TID [facility]; Protocol


   Stop: 05/10/20 08:59


   Last Admin: 20 13:22 Dose:  200 mg


Docusate Sodium (Colace)  100 mg PO BID [facility]


   Stop: 05/10/20 08:59


   Last Admin: 20 08:12 Dose:  100 mg


Fluphenazine HCl (Prolixin)  2.5 mg PO DAILY [facility]; Protocol


   Stop: 05/10/20 08:59


   Last Admin: 20 08:12 Dose:  2.5 mg


Glipizide (Glucotrol)  5 mg PO BID [facility]


   Stop: 05/10/20 08:59


   Last Admin: 20 08:13 Dose:  5 mg


Lorazepam (Ativan)  0.5 mg PO Q4HR [facility]N; Protocol


   PRN Reason: Anxiety


   Stop: 20 21:58


Magnesium Hydroxide (Milk Of Magnesia)  30 ml PO HS PRN


   PRN Reason: Constipation


   Stop: 05/10/20 00:03


Metformin HCl (Glucophage)  1,000 mg PO BID [facility]


   Stop: 05/10/20 08:59


   Last Admin: 20 08:12 Dose:  1,000 mg


Multivitamins/Vitamin C (Theragran)  1 tab PO DAILY [facility]


   Stop: 05/10/20 08:59


   Last Admin: 20 08:13 Dose:  1 tab


Olanzapine 10 mg/ Olanzapine 2 (.5 mg)  12.5 mg PO BID [facility]


   Stop: 05/10/20 16:59


   Last Admin: 20 08:12 Dose:  12.5 mg


Zolpidem Tartrate (Ambien)  5 mg PO HS PRN


   PRN Reason: Insomnia


   Stop: 20 21:58








General: alert, demented


HEENT: NC/AT, PERRLA, EOMI, anicteric sclerae, throat clear


Neck: Supple, No JVD, No thyromegaly, +2 carotid pulse wo bruit, No LAD


Lungs: CTAB


Cardiovascular: Normal S1, Normal S2, without murmur


Abdomen: soft, non-tender, non-distended


Extremities: clear


Neurological: no change, gait stable





Internal Medicine Assmt/Plan





- Assessment


Assessment: 





1.DM.


2.HYPERLIPIDEMIA.


3.SEIZURE DISORDER


4.SCHIZO AFFECTIVE DISORDERS





- Plan


Plan: 





CONTINUE ON CURRENT MEDICATION AND DIET





Nutritional Asmnt/Malnutr-PDOC





- Dietary Evaluation


Malnutrition Findings (Please click <Entered> for more info): 








Nutritional Asmnt/Malnutrition                             Start:  20 13:

27


Text:                                                      Status: Complete    

  


Freq:                                                                          

  


Protocol:                                                                      

  


 Document     20 13:28  AMY  (Rec: 20 13:35  AMY [id number]-FNS1)


 Nutritional Asmnt/Malnutrition


     Patient General Information


      Nutritional Screening                      Moderate Risk


      Diagnosis                                  Psychosis


      Pertinent Medical Hx/Surgical Hx           Diabetes mellitus, seizure


                                                 disorder, hyperlipidemia,


                                                 schizoaffective disorder


      Subjective Information                     Pt is a 49-year-old female


                                                 admitted on 03/10 d/t acting


                                                 out, agitated, and delusional.


                                                 Pt is eating an estimated 100


                                                 % of meals Per Meal/Nutrition


                                                 Activity Record. Dietary is


                                                 currently providing an


                                                 estimated 2017 kcals and 120


                                                 gm Pro (x2 days), per Pt PO


                                                 intake this is providing an


                                                 estimated 2017 kcals and 120gm


                                                 Pro to meet 100% kcal and 100


                                                 % Pro needs- adequate.


                                                 Visited pt. after lunch,


                                                 introduced myself, inquired


                                                 how she was eating. Pt. stated


                                                 she was eating just fine.


                                                 Spoke to pt. about their high


                                                 TG levels, pt. understood and


                                                 stated they are watching their


                                                 fat intake and reducing meat


                                                 consumption.


                                                 Recommendation to add a


                                                 cardiac diet due hx of


                                                 hyperlipidemia and labs 


                                                 (3/10). Pt. nurse Sara has


                                                 placed the order to change


                                                 diet per recommendation.


                                                 Anthropometrics


                                                 HT: 58


                                                 WT: 186 LB (84.55 kg)


                                                 ABW: 151 LB (68.64)


                                                 BMI: 28.28 (Overweight)


                                                 GI/ Skin Integrity


                                                 GI: WNL, Soft, Non-Tender


                                                 BM: 03/11 x1


                                                 I/O: 240/Not Noted


                                                 Skin: WNL, Intact


                                                 Josse: 21


                                                 Diet Order: [facility]


                                                 Estimated Energy Needs: (


                                                 Geriatric, ABW)


                                                 4212-7584 kcals (25-30 kcals/


                                                 kg)


                                                 69-82g Pro (1.0-1.2 g/kg)


                                                 1700- 2000 ml (25-30 ml/kg)


      Current Diet Order/ Nutrition Support      [facility]


      Pertinent Medications                      Maalox (PRN), Lipitor,


                                                 Tegretol, Colace, Glucotrol,


                                                 MOM, Glucophage, Theragran


      Pertinent Labs                             3/10: Glucose 122, , HDL


                                                 52, Na 124, Ca 8.1, Cl 91


     Nutritional Hx/Data


      Height                                     1.73 m


      Height (Calculated Centimeters)            172.7


      Current Weight (lbs)                       84.368 kg


      Weight (Calculated Kilograms)              84.4


      Weight (Calculated Grams)                  84452.2


      Ideal Body Weight                          140


      % Ideal Body Weight                        107


      Body Mass Index (BMI)                      28.3


      Weight Status                              Overweight


     GI Symptoms


      GI Symptoms                                None


      Last BM                                    


      Skin Integrity/Comment:                    Skin: WNL, Intact


                                                 Josse: 21


      Current %PO                                Good (%)


     Estimated Nutritional Goals


      BEE in Kcals:                              Adj wt of IBW


      Calories/Kcals/Kg                          25-30


      Kcals Calculated                           1685-5123


      Protein:                                   Adj wt of IBW


      Protein g/k.0-1.2


      Protein Calculated                         69-82


      Fluid: ml                                  1700- 2000 ml (25-30 ml/kg)


     Nutritional Problem


      1. Problem


       Problem                                   Altered nutrition related labs


       Etiology                                  labs r/t endocrine dysfunction


                                                 and history of hyperlipidemia


       Signs/Symptoms:                           aeb recent laboratory values,


                                                 Glucose 122, 


     Malnutrition Related to Morbid Obesity


      Malnutrition related to morbid obesity     No


     Intervention/Recommendation


      Comments                                   1. Recommendation to add a


                                                 cardiac diet to current diet


                                                 rx.


                                                 2. Continue antihyperglycemic


                                                 medications for glucose


                                                 control per MD order.


     Expected Outcomes/Goals


      Expected Outcomes/Goals                    1. PO intake to continue to


                                                 meet >75% of estimated


                                                 nutritional needs.


                                                 2.Monitor PO intake, wt,


                                                 nutrition related labs, and


                                                 skin integrity to trend WNL.


                                                 3. F/U as low risk in 7-10


                                                 days, 3/20-

## 2020-03-14 NOTE — PROGRESS NOTES
DATE:  03/14/2020



A 49-year-old female coming from [place], acting out, psychotic, agitated,

delusional, believing people are against her.  Apparently, scratching self and

also conserved.  The patient confused, stating she is here to "[place]

[place]."  Seems to be able to have a conversation with me, but not really

making much sense.  Medications were noted, seems to be responding to internal

stimuli, asking staff over and over that she is ready to check out, go to a

"board and care."  We will continue to monitor.  Time was spent speaking with

the patient, review of nursing notes.  Medications were noted.  Labs were

reviewed.  We will continue to monitor on inpatient basis.





DD: 03/14/2020 07:13

DT: 03/14/2020 07:22

JOB# [id number]  [id number]

## 2020-03-14 NOTE — GENERAL PROGRESS NOTE
Subjective





- Review of Systems


Service Date: 20


Subjective: 





resting comfortably


no distress





Objective





- Physical Exam


Vitals and I&O: 


 Vital Signs











Temp  97.9 F   20 20:01


 


Pulse  95   20 20:01


 


Resp  19   20 20:01


 


BP  121/75   20 20:01


 


Pulse Ox  98   20 20:01








 Intake & Output











 03/14/20 03/14/20 03/15/20





 06:59 18:59 06:59


 


Intake Total 360 1200 120


 


Output Total 1  


 


Balance 359 1200 120


 


Intake:   


 


  Oral 360 1200 120


 


Output:   


 


  Stool 1  


 


Other:   


 


  # Voids 2  2


 


  # Bowel Movements  1 0











Active Medications: 


Current Medications





Acetaminophen (Tylenol)  650 mg PO Q4H PRN


   PRN Reason: Pain (Mild 1-3)


   Stop: 20 21:58


Acetaminophen (Tylenol)  650 mg PO Q4HR PRN


   PRN Reason: Temp above 100


   Stop: 05/10/20 00:03


Al Hydrox/Mg Hydrox/Simethicone (Maalox)  30 ml PO Q4HR PRN


   PRN Reason: GI DISTRESS


   Stop: 05/10/20 00:03


Atorvastatin Calcium (Lipitor)  10 mg PO HS [facility]; Protocol


   Stop: 05/10/20 20:59


   Last Admin: 20 20:57 Dose:  10 mg


Carbamazepine (Tegretol)  200 mg PO TID [facility]; Protocol


   Stop: 05/10/20 08:59


   Last Admin: 20 14:22 Dose:  200 mg


Docusate Sodium (Colace)  100 mg PO BID [facility]


   Stop: 05/10/20 08:59


   Last Admin: 20 16:24 Dose:  100 mg


Fluphenazine HCl (Prolixin)  2.5 mg PO DAILY [facility]; Protocol


   Stop: 05/10/20 08:59


   Last Admin: 20 09:05 Dose:  2.5 mg


Glipizide (Glucotrol)  5 mg PO BID [facility]


   Stop: 05/10/20 08:59


   Last Admin: 20 16:24 Dose:  5 mg


Lorazepam (Ativan)  0.5 mg PO Q4HR [facility]N; Protocol


   PRN Reason: Anxiety


   Stop: 20 21:58


   Last Admin: 20 13:00 Dose:  0.5 mg


Magnesium Hydroxide (Milk Of Magnesia)  30 ml PO HS PRN


   PRN Reason: Constipation


   Stop: 05/10/20 00:03


Metformin HCl (Glucophage)  1,000 mg PO BID [facility]


   Stop: 05/10/20 08:59


   Last Admin: 20 16:25 Dose:  1,000 mg


Multivitamins/Vitamin C (Theragran)  1 tab PO DAILY [facility]


   Stop: 05/10/20 08:59


   Last Admin: 20 09:02 Dose:  1 tab


Olanzapine 10 mg/ Olanzapine 2 (.5 mg)  12.5 mg PO BID [facility]


   Stop: 05/10/20 16:59


   Last Admin: 20 16:23 Dose:  12.5 mg


Zolpidem Tartrate (Ambien)  5 mg PO HS PRN


   PRN Reason: Insomnia


   Stop: 20 21:58








General: Alert, Oriented x3


HEENT: Atraumatic, PERRLA, EOMI


Neck: Supple, JVD, Thyromegaly


Cardiovascular: Regular rate, Normal S1, Normal S2


Lungs: Clear to auscultation


Abdomen: Bowel sounds, Soft





Assessment/Plan





- Assessment


Assessment: 





1.DM.


2.HYPERLIPIDEMIA.


3.SEIZURE DISORDER


4.SCHIZO AFFECTIVE DISORDERS





- Plan


Plan: 





continue current treatment





Nutritional Asmnt/Malnutr-PDOC





- Dietary Evaluation


Malnutrition Findings (Please click <Entered> for more info): 








Nutritional Asmnt/Malnutrition                             Start:  20 13:

27


Text:                                                      Status: Complete    

  


Freq:                                                                          

  


Protocol:                                                                      

  


 Document     20 13:28  AMY  (Rec: 20 13:35  AMY DRIVER-FNS1)


 Nutritional Asmnt/Malnutrition


     Patient General Information


      Nutritional Screening                      Moderate Risk


      Diagnosis                                  Psychosis


      Pertinent Medical Hx/Surgical Hx           Diabetes mellitus, seizure


                                                 disorder, hyperlipidemia,


                                                 schizoaffective disorder


      Subjective Information                     Pt is a 49-year-old female


                                                 admitted on 03/10 d/t acting


                                                 out, agitated, and delusional.


                                                 Pt is eating an estimated 100


                                                 % of meals Per Meal/Nutrition


                                                 Activity Record. Dietary is


                                                 currently providing an


                                                 estimated 2017 kcals and 120


                                                 gm Pro (x2 days), per Pt PO


                                                 intake this is providing an


                                                 estimated 2017 kcals and 120gm


                                                 Pro to meet 100% kcal and 100


                                                 % Pro needs- adequate.


                                                 Visited pt. after lunch,


                                                 introduced myself, inquired


                                                 how she was eating. Pt. stated


                                                 she was eating just fine.


                                                 Spoke to pt. about their high


                                                 TG levels, pt. understood and


                                                 stated they are watching their


                                                 fat intake and reducing meat


                                                 consumption.


                                                 Recommendation to add a


                                                 cardiac diet due hx of


                                                 hyperlipidemia and labs 


                                                 (3/10). Pt. nurse Sara has


                                                 placed the order to change


                                                 diet per recommendation.


                                                 Anthropometrics


                                                 HT: 58


                                                 WT: 186 LB (84.55 kg)


                                                 ABW: 151 LB (68.64)


                                                 BMI: 28.28 (Overweight)


                                                 GI/ Skin Integrity


                                                 GI: WNL, Soft, Non-Tender


                                                 BM: 03/11 x1


                                                 I/O: 240/Not Noted


                                                 Skin: WNL, Intact


                                                 Josse: 21


                                                 Diet Order: [facility]


                                                 Estimated Energy Needs: (


                                                 Geriatric, ABW)


                                                 4031-0755 kcals (25-30 kcals/


                                                 kg)


                                                 69-82g Pro (1.0-1.2 g/kg)


                                                 1700- 2000 ml (25-30 ml/kg)


      Current Diet Order/ Nutrition Support      [facility]


      Pertinent Medications                      Maalox (PRN), Lipitor,


                                                 Tegretol, Colace, Glucotrol,


                                                 MOM, Glucophage, Theragran


      Pertinent Labs                             3/10: Glucose 122, , HDL


                                                 52, Na 124, Ca 8.1, Cl 91


     Nutritional Hx/Data


      Height                                     1.73 m


      Height (Calculated Centimeters)            172.7


      Current Weight (lbs)                       84.368 kg


      Weight (Calculated Kilograms)              84.4


      Weight (Calculated Grams)                  16925.2


      Ideal Body Weight                          140


      % Ideal Body Weight                        107


      Body Mass Index (BMI)                      28.3


      Weight Status                              Overweight


     GI Symptoms


      GI Symptoms                                None


      Last BM                                    


      Skin Integrity/Comment:                    Skin: WNL, Intact


                                                 Josse: 21


      Current %PO                                Good (%)


     Estimated Nutritional Goals


      BEE in Kcals:                              Adj wt of IBW


      Calories/Kcals/Kg                          25-30


      Kcals Calculated                           6527-8882


      Protein:                                   Adj wt of IBW


      Protein g/k.0-1.2


      Protein Calculated                         69-82


      Fluid: ml                                  1700- 2000 ml (25-30 ml/kg)


     Nutritional Problem


      1. Problem


       Problem                                   Altered nutrition related labs


       Etiology                                  labs r/t endocrine dysfunction


                                                 and history of hyperlipidemia


       Signs/Symptoms:                           aeb recent laboratory values,


                                                 Glucose 122, 


     Malnutrition Related to Morbid Obesity


      Malnutrition related to morbid obesity     No


     Intervention/Recommendation


      Comments                                   1. Recommendation to add a


                                                 cardiac diet to current diet


                                                 rx.


                                                 2. Continue antihyperglycemic


                                                 medications for glucose


                                                 control per MD order.


     Expected Outcomes/Goals


      Expected Outcomes/Goals                    1. PO intake to continue to


                                                 meet >75% of estimated


                                                 nutritional needs.


                                                 2.Monitor PO intake, wt,


                                                 nutrition related labs, and


                                                 skin integrity to trend WNL.


                                                 3. F/U as low risk in 7-10


                                                 days, 3/20-

## 2020-03-15 NOTE — GENERAL PROGRESS NOTE
Subjective





- Review of Systems


Service Date: 03/15/20


Subjective: 





resting comfortably


no distress





Objective





- Physical Exam


Vitals and I&O: 


 Vital Signs











Temp  98.2 F   03/15/20 06:26


 


Pulse  105   03/15/20 06:26


 


Resp  19   03/15/20 08:00


 


BP  126/92   03/15/20 06:26


 


Pulse Ox  96   03/15/20 06:26








 Intake & Output











 03/14/20 03/15/20 03/15/20





 18:59 06:59 18:59


 


Intake Total 1200 240 


 


Balance 1200 240 


 


Intake:   


 


  Oral 1200 240 


 


Other:   


 


  # Voids  1 


 


  # Bowel Movements 1 0 











Active Medications: 


Current Medications





Acetaminophen (Tylenol)  650 mg PO Q4H PRN


   PRN Reason: Pain (Mild 1-3)


   Stop: 20 21:58


Acetaminophen (Tylenol)  650 mg PO Q4HR PRN


   PRN Reason: Temp above 100


   Stop: 05/10/20 00:03


Al Hydrox/Mg Hydrox/Simethicone (Maalox)  30 ml PO Q4HR PRN


   PRN Reason: GI DISTRESS


   Stop: 05/10/20 00:03


Atorvastatin Calcium (Lipitor)  10 mg PO HS [facility]; Protocol


   Stop: 05/10/20 20:59


   Last Admin: 20 21:05 Dose:  10 mg


Carbamazepine (Tegretol)  200 mg PO TID [facility]; Protocol


   Stop: 05/10/20 08:59


   Last Admin: 03/15/20 13:06 Dose:  200 mg


Docusate Sodium (Colace)  100 mg PO BID [facility]


   Stop: 05/10/20 08:59


   Last Admin: 03/15/20 08:32 Dose:  100 mg


Fluphenazine HCl (Prolixin)  2.5 mg PO DAILY [facility]; Protocol


   Stop: 05/10/20 08:59


   Last Admin: 03/15/20 08:29 Dose:  2.5 mg


Glipizide (Glucotrol)  5 mg PO BID [facility]


   Stop: 05/10/20 08:59


   Last Admin: 03/15/20 08:31 Dose:  5 mg


Lorazepam (Ativan)  0.5 mg PO Q4HR PRN; Protocol


   PRN Reason: Anxiety


   Stop: 20 21:58


   Last Admin: 20 13:00 Dose:  0.5 mg


Magnesium Hydroxide (Milk Of Magnesia)  30 ml PO HS PRN


   PRN Reason: Constipation


   Stop: 05/10/20 00:03


Metformin HCl (Glucophage)  1,000 mg PO BID [facility]


   Stop: 05/10/20 08:59


   Last Admin: 03/15/20 08:33 Dose:  1,000 mg


Multivitamins/Vitamin C (Theragran)  1 tab PO DAILY [facility]


   Stop: 05/10/20 08:59


   Last Admin: 03/15/20 08:32 Dose:  1 tab


Olanzapine 10 mg/ Olanzapine 2 (.5 mg)  12.5 mg PO BID [facility]


   Stop: 05/10/20 16:59


   Last Admin: 03/15/20 08:31 Dose:  12.5 mg


Zolpidem Tartrate (Ambien)  5 mg PO HS PRN


   PRN Reason: Insomnia


   Stop: 20 21:58


   Last Admin: 20 21:06 Dose:  5 mg








General: Alert, Oriented x3


HEENT: Atraumatic, PERRLA, EOMI


Neck: Supple, JVD, Thyromegaly


Cardiovascular: Regular rate, Normal S1, Normal S2


Lungs: Clear to auscultation


Abdomen: Bowel sounds, Soft





Assessment/Plan





- Assessment


Assessment: 





1.DM.


2.HYPERLIPIDEMIA.


3.SEIZURE DISORDER


4.SCHIZO AFFECTIVE DISORDERS





- Plan


Plan: 





continue current treatment





Nutritional Asmnt/Malnutr-PDOC





- Dietary Evaluation


Malnutrition Findings (Please click <Entered> for more info): 








Nutritional Asmnt/Malnutrition                             Start:  20 13:

27


Text:                                                      Status: Complete    

  


Freq:                                                                          

  


Protocol:                                                                      

  


 Document     20 13:28  AMY  (Rec: 20 13:35  AMY [id number])


 Nutritional Asmnt/Malnutrition


     Patient General Information


      Nutritional Screening                      Moderate Risk


      Diagnosis                                  Psychosis


      Pertinent Medical Hx/Surgical Hx           Diabetes mellitus, seizure


                                                 disorder, hyperlipidemia,


                                                 schizoaffective disorder


      Subjective Information                     Pt is a 49-year-old female


                                                 admitted on 03/10 d/t acting


                                                 out, agitated, and delusional.


                                                 Pt is eating an estimated 100


                                                 % of meals Per Meal/Nutrition


                                                 Activity Record. Dietary is


                                                 currently providing an


                                                 estimated 2017 kcals and 120


                                                 gm Pro (x2 days), per Pt PO


                                                 intake this is providing an


                                                 estimated 2017 kcals and 120gm


                                                 Pro to meet 100% kcal and 100


                                                 % Pro needs- adequate.


                                                 Visited pt. after lunch,


                                                 introduced myself, inquired


                                                 how she was eating. Pt. stated


                                                 she was eating just fine.


                                                 Spoke to pt. about their high


                                                 TG levels, pt. understood and


                                                 stated they are watching their


                                                 fat intake and reducing meat


                                                 consumption.


                                                 Recommendation to add a


                                                 cardiac diet due hx of


                                                 hyperlipidemia and labs 


                                                 (3/10). Pt. nurse Sara has


                                                 placed the order to change


                                                 diet per recommendation.


                                                 Anthropometrics


                                                 HT: 58


                                                 WT: 186 LB (84.55 kg)


                                                 ABW: 151 LB (68.64)


                                                 BMI: 28.28 (Overweight)


                                                 GI/ Skin Integrity


                                                 GI: WNL, Soft, Non-Tender


                                                 BM: 03/11 x1


                                                 I/O: 240/Not Noted


                                                 Skin: WNL, Intact


                                                 Josse: 21


                                                 Diet Order: [facility]


                                                 Estimated Energy Needs: (


                                                 Geriatric, ABW)


                                                 1781-0592 kcals (25-30 kcals/


                                                 kg)


                                                 69-82g Pro (1.0-1.2 g/kg)


                                                 1700- 2000 ml (25-30 ml/kg)


      Current Diet Order/ Nutrition Support      [facility]


      Pertinent Medications                      Maalox (PRN), Lipitor,


                                                 Tegretol, Colace, Glucotrol,


                                                 MOM, Glucophage, Theragran


      Pertinent Labs                             3/10: Glucose 122, , HDL


                                                 52, Na 124, Ca 8.1, Cl 91


     Nutritional Hx/Data


      Height                                     1.73 m


      Height (Calculated Centimeters)            172.7


      Current Weight (lbs)                       84.368 kg


      Weight (Calculated Kilograms)              84.4


      Weight (Calculated Grams)                  17823.2


      Ideal Body Weight                          140


      % Ideal Body Weight                        107


      Body Mass Index (BMI)                      28.3


      Weight Status                              Overweight


     GI Symptoms


      GI Symptoms                                None


      Last BM                                    


      Skin Integrity/Comment:                    Skin: WNL, Intact


                                                 Josse: 21


      Current %PO                                Good (%)


     Estimated Nutritional Goals


      BEE in Kcals:                              Adj wt of IBW


      Calories/Kcals/Kg                          25-30


      Kcals Calculated                           4549-2650


      Protein:                                   Adj wt of IBW


      Protein g/k.0-1.2


      Protein Calculated                         69-82


      Fluid: ml                                  1700- 2000 ml (25-30 ml/kg)


     Nutritional Problem


      1. Problem


       Problem                                   Altered nutrition related labs


       Etiology                                  labs r/t endocrine dysfunction


                                                 and history of hyperlipidemia


       Signs/Symptoms:                           aeb recent laboratory values,


                                                 Glucose 122, 


     Malnutrition Related to Morbid Obesity


      Malnutrition related to morbid obesity     No


     Intervention/Recommendation


      Comments                                   1. Recommendation to add a


                                                 cardiac diet to current diet


                                                 rx.


                                                 2. Continue antihyperglycemic


                                                 medications for glucose


                                                 control per MD order.


     Expected Outcomes/Goals


      Expected Outcomes/Goals                    1. PO intake to continue to


                                                 meet >75% of estimated


                                                 nutritional needs.


                                                 2.Monitor PO intake, wt,


                                                 nutrition related labs, and


                                                 skin integrity to trend WNL.


                                                 3. F/U as low risk in 7-10


                                                 days, 3/20-

## 2020-03-15 NOTE — PROGRESS NOTES
DATE:  03/15/2020



PHYSICIAN:  Dr. Josefina Kleinrees



COVERING:  Dr. Sylvia Forbes



SUBJECTIVE:  The patient was interviewed.  Case was discussed with staff.  Chart

and records were reviewed.  Per the staff, the patient has been depressed, sad

and withdrawn.  The patient does have episodes of agitation and does have

delusional thought at times.  The patient was interviewed at bedside.  She

appears to be disorganized.  She is not making any sense.  She reports that she

wants to make her own board and care in [place] and she will live there.  She

is otherwise unable to cooperate very much with the interview and quite

disorganized and interview was terminated prematurely due to her poor ability to

cooperate.



MENTAL STATUS EXAMINATION:  The patient is lying in the hospital bed.  She is

calm, but at times guarded.  Speech is soft.  Mood and affect appear to be

blunted.  Thought process appears to be disorganized.  Unable to assess for

suicidal or homicidal thoughts.  The patient does appear to be internally

preoccupied and also making bizarre statements and has bizarre delusions.  She

is alert and oriented to person only.  Her insight, judgment and impulse control

appear to be quite poor at this time.



ASSESSMENT AND PLAN:  We will continue the patient's acute hospitalization.  We

will continue medications as prescribed.  Encourage the patient to verbalize her

needs and to participate in group and milieu therapy.





DD: 03/15/2020 22:58

DT: 03/15/2020 23:49

[facility]# [id number]  [id number]

## 2020-03-16 NOTE — INTERNAL MEDICINE PROG NOTE
Internal Medicine Subjective





- Subjective


Service Date: 20


Patient seen and examined:: without staff (SHE FEELS WELL)


Patient is:: awake, verbal, ambulating, talking, confused


Per staff patient has:: no adverse event, eating well, confused, tolerating meds





Internal Medicine Objective





- Physical Exam


Vitals and I&O: 


 Vital Signs











Temp  97.6 F   20 14:58


 


Pulse  105   20 14:58


 


Resp  20   20 14:58


 


BP  153/93   20 14:58


 


Pulse Ox  97   20 14:58








 Intake & Output











 03/15/20 03/16/20 03/16/20





 18:59 06:59 18:59


 


Intake Total 550 360 


 


Balance 550 360 


 


Intake:   


 


  Oral 550 360 


 


Other:   


 


  # Voids  1 


 


  # Bowel Movements 1 0 











Active Medications: 


Current Medications





Acetaminophen (Tylenol)  650 mg PO Q4H PRN


   PRN Reason: Pain (Mild 1-3)


   Stop: 20 21:58


Acetaminophen (Tylenol)  650 mg PO Q4HR PRN


   PRN Reason: Temp above 100


   Stop: 05/10/20 00:03


Al Hydrox/Mg Hydrox/Simethicone (Maalox)  30 ml PO Q4HR PRN


   PRN Reason: GI DISTRESS


   Stop: 05/10/20 00:03


Atorvastatin Calcium (Lipitor)  10 mg PO HS [facility]; Protocol


   Stop: 05/10/20 20:59


   Last Admin: 03/15/20 20:41 Dose:  10 mg


Carbamazepine (Tegretol)  200 mg PO TID [facility]; Protocol


   Stop: 05/10/20 08:59


   Last Admin: 20 13:08 Dose:  Not Given


Docusate Sodium (Colace)  100 mg PO BID [facility]


   Stop: 05/10/20 08:59


   Last Admin: 20 16:31 Dose:  100 mg


Fluphenazine HCl (Prolixin)  2.5 mg PO DAILY [facility]; Protocol


   Stop: 05/10/20 08:59


   Last Admin: 20 08:13 Dose:  2.5 mg


Glipizide (Glucotrol)  5 mg PO BID [facility]


   Stop: 05/10/20 08:59


   Last Admin: 20 16:44 Dose:  5 mg


Lorazepam (Ativan)  0.5 mg PO Q4HR PRN; Protocol


   PRN Reason: Anxiety


   Stop: 20 21:58


   Last Admin: 20 13:00 Dose:  0.5 mg


Magnesium Hydroxide (Milk Of Magnesia)  30 ml PO HS PRN


   PRN Reason: Constipation


   Stop: 05/10/20 00:03


Metformin HCl (Glucophage)  1,000 mg PO BID [facility]


   Stop: 05/10/20 08:59


   Last Admin: 20 16:31 Dose:  1,000 mg


Multivitamins/Vitamin C (Theragran)  1 tab PO DAILY [facility]


   Stop: 05/10/20 08:59


   Last Admin: 20 08:13 Dose:  1 tab


Olanzapine (Zyprexa)  15 mg PO BID [facility]


   Stop: 05/15/20 16:59


   Last Admin: 20 16:31 Dose:  15 mg


Zolpidem Tartrate (Ambien)  5 mg PO HS PRN


   PRN Reason: Insomnia


   Stop: 20 21:58


   Last Admin: 03/15/20 20:41 Dose:  5 mg








General: alert, demented


HEENT: NC/AT, PERRLA, EOMI, anicteric sclerae, throat clear


Neck: Supple, No JVD, No thyromegaly, +2 carotid pulse wo bruit, No LAD


Lungs: CTAB


Cardiovascular: Normal S1, Normal S2, without murmur


Abdomen: soft, non-tender, non-distended


Extremities: clear


Neurological: no change, gait stable





Internal Medicine Assmt/Plan





- Assessment


Assessment: 





1.DM.


2.HYPERLIPIDEMIA.


3.SEIZURE DISORDER


4.SCHIZO AFFECTIVE DISORDERS





- Plan


Plan: 





CONTINUE ON CURRENT MEDICATION AND DIET





Nutritional Asmnt/Malnutr-PDOC





- Dietary Evaluation


Malnutrition Findings (Please click <Entered> for more info): 








Nutritional Asmnt/Malnutrition                             Start:  20 13:

27


Text:                                                      Status: Complete    

  


Freq:                                                                          

  


Protocol:                                                                      

  


 Document     20 13:28  AMY  (Rec: 20 13:35  AMY DRIVER-FNS1)


 Nutritional Asmnt/Malnutrition


     Patient General Information


      Nutritional Screening                      Moderate Risk


      Diagnosis                                  Psychosis


      Pertinent Medical Hx/Surgical Hx           Diabetes mellitus, seizure


                                                 disorder, hyperlipidemia,


                                                 schizoaffective disorder


      Subjective Information                     Pt is a 49-year-old female


                                                 admitted on 03/10 d/t acting


                                                 out, agitated, and delusional.


                                                 Pt is eating an estimated 100


                                                 % of meals Per Meal/Nutrition


                                                 Activity Record. Dietary is


                                                 currently providing an


                                                 estimated 2017 kcals and 120


                                                 gm Pro (x2 days), per Pt PO


                                                 intake this is providing an


                                                 estimated 2017 kcals and 120gm


                                                 Pro to meet 100% kcal and 100


                                                 % Pro needs- adequate.


                                                 Visited pt. after lunch,


                                                 introduced myself, inquired


                                                 how she was eating. Pt. stated


                                                 she was eating just fine.


                                                 Spoke to pt. about their high


                                                 TG levels, pt. understood and


                                                 stated they are watching their


                                                 fat intake and reducing meat


                                                 consumption.


                                                 Recommendation to add a


                                                 cardiac diet due hx of


                                                 hyperlipidemia and labs 


                                                 (3/10). Pt. nurse Sara has


                                                 placed the order to change


                                                 diet per recommendation.


                                                 Anthropometrics


                                                 HT: 58


                                                 WT: 186 LB (84.55 kg)


                                                 ABW: 151 LB (68.64)


                                                 BMI: 28.28 (Overweight)


                                                 GI/ Skin Integrity


                                                 GI: WNL, Soft, Non-Tender


                                                 BM: 03/11 x1


                                                 I/O: 240/Not Noted


                                                 Skin: WNL, Intact


                                                 Josse: 21


                                                 Diet Order: [facility]


                                                 Estimated Energy Needs: (


                                                 Geriatric, ABW)


                                                 4434-9282 kcals (25-30 kcals/


                                                 kg)


                                                 69-82g Pro (1.0-1.2 g/kg)


                                                 1700- 2000 ml (25-30 ml/kg)


      Current Diet Order/ Nutrition Support      [facility]


      Pertinent Medications                      Maalox (PRN), Lipitor,


                                                 Tegretol, Colace, Glucotrol,


                                                 MOM, Glucophage, Theragran


      Pertinent Labs                             3/10: Glucose 122, , HDL


                                                 52, Na 124, Ca 8.1, Cl 91


     Nutritional Hx/Data


      Height                                     1.73 m


      Height (Calculated Centimeters)            172.7


      Current Weight (lbs)                       84.368 kg


      Weight (Calculated Kilograms)              84.4


      Weight (Calculated Grams)                  51182.2


      Ideal Body Weight                          140


      % Ideal Body Weight                        107


      Body Mass Index (BMI)                      28.3


      Weight Status                              Overweight


     GI Symptoms


      GI Symptoms                                None


      Last BM                                    


      Skin Integrity/Comment:                    Skin: WNL, Intact


                                                 Josse: 21


      Current %PO                                Good (%)


     Estimated Nutritional Goals


      BEE in Kcals:                              Adj wt of IBW


      Calories/Kcals/Kg                          25-30


      Kcals Calculated                           5427-6447


      Protein:                                   Adj wt of IBW


      Protein g/k.0-1.2


      Protein Calculated                         69-82


      Fluid: ml                                  1700- 2000 ml (25-30 ml/kg)


     Nutritional Problem


      1. Problem


       Problem                                   Altered nutrition related labs


       Etiology                                  labs r/t endocrine dysfunction


                                                 and history of hyperlipidemia


       Signs/Symptoms:                           aeb recent laboratory values,


                                                 Glucose 122, 


     Malnutrition Related to Morbid Obesity


      Malnutrition related to morbid obesity     No


     Intervention/Recommendation


      Comments                                   1. Recommendation to add a


                                                 cardiac diet to current diet


                                                 rx.


                                                 2. Continue antihyperglycemic


                                                 medications for glucose


                                                 control per MD order.


     Expected Outcomes/Goals


      Expected Outcomes/Goals                    1. PO intake to continue to


                                                 meet >75% of estimated


                                                 nutritional needs.


                                                 2.Monitor PO intake, wt,


                                                 nutrition related labs, and


                                                 skin integrity to trend WNL.


                                                 3. F/U as low risk in 7-10


                                                 days, 3/20-

## 2020-03-16 NOTE — PROGRESS NOTES
DATE:     03/16/2020



Case was discussed with staff of the patient, reviewed records.  The patient

continues to be paranoid, continues to be depressed, still has episodes of

agitation, delusional.  The patient continues to be disorganized, unable to make

safe plan for self-care, not participate in meaningful conversation.  She is

grandiose, believes she can make her own board and care in [place].  Continues

to have poor insight.  No side effects with the medication, no sedation, no

nausea, no extrapyramidal symptoms.  I will be increasing her Zyprexa to 15mg

twice a day to help improve her psychotic symptoms, delusional behavior,

agitated behavior.  We will continue outpatient group therapy, milieu therapy,

adjust the medication as needed.





DD: 03/16/2020 12:29

DT: 03/16/2020 19:44

JOB# [id number]  [id number]

[email or web address]

## 2020-03-17 NOTE — INTERNAL MEDICINE PROG NOTE
Internal Medicine Subjective





- Subjective


Service Date: 20


Patient seen and examined:: without staff (SHE FEELS WELL)


Patient is:: awake, verbal, ambulating, talking, confused


Per staff patient has:: no adverse event, eating well, confused, tolerating meds





Internal Medicine Objective





- Physical Exam


Vitals and I&O: 


 Vital Signs











Temp  97.9 F   20 14:32


 


Pulse  95   20 14:32


 


Resp  18   20 14:32


 


BP  130/96   20 14:32


 


Pulse Ox  99   20 14:32








 Intake & Output











 20





 06:59 18:59 06:59


 


Intake Total 120  


 


Balance 120  


 


Intake:   


 


  Oral 120  


 


Other:   


 


  # Voids 3 3 


 


  # Bowel Movements 0 1 











Active Medications: 


Current Medications





Acetaminophen (Tylenol)  650 mg PO Q4H PRN


   PRN Reason: Pain (Mild 1-3)


   Stop: 20 21:58


Acetaminophen (Tylenol)  650 mg PO Q4HR PRN


   PRN Reason: Temp above 100


   Stop: 05/10/20 00:03


Al Hydrox/Mg Hydrox/Simethicone (Maalox)  30 ml PO Q4HR PRN


   PRN Reason: GI DISTRESS


   Stop: 05/10/20 00:03


Atorvastatin Calcium (Lipitor)  10 mg PO HS [facility]; Protocol


   Stop: 05/10/20 20:59


   Last Admin: 20 20:24 Dose:  10 mg


Carbamazepine (Tegretol)  200 mg PO TID [facility]; Protocol


   Stop: 05/10/20 08:59


   Last Admin: 20 20:24 Dose:  Not Given


Docusate Sodium (Colace)  100 mg PO BID [facility]


   Stop: 05/10/20 08:59


   Last Admin: 20 17:31 Dose:  100 mg


Fluphenazine HCl (Prolixin)  2.5 mg PO DAILY [facility]; Protocol


   Stop: 05/10/20 08:59


   Last Admin: 20 08:19 Dose:  2.5 mg


Glipizide (Glucotrol)  5 mg PO BID [facility]


   Stop: 05/10/20 08:59


   Last Admin: 20 17:31 Dose:  5 mg


Lorazepam (Ativan)  0.5 mg PO Q4HR [facility]; Protocol


   PRN Reason: Anxiety


   Stop: 20 21:58


   Last Admin: 20 13:00 Dose:  0.5 mg


Magnesium Hydroxide (Milk Of Magnesia)  30 ml PO HS PRN


   PRN Reason: Constipation


   Stop: 05/10/20 00:03


Metformin HCl (Glucophage)  1,000 mg PO BID [facility]


   Stop: 05/10/20 08:59


   Last Admin: 20 17:31 Dose:  1,000 mg


Multivitamins/Vitamin C (Theragran)  1 tab PO DAILY [facility]


   Stop: 05/10/20 08:59


   Last Admin: 20 08:19 Dose:  1 tab


Olanzapine (Zyprexa)  15 mg PO BID [facility]


   Stop: 05/15/20 16:59


   Last Admin: 20 17:31 Dose:  15 mg


Zolpidem Tartrate (Ambien)  5 mg PO HS PRN


   PRN Reason: Insomnia


   Stop: 20 21:58


   Last Admin: 20 21:00 Dose:  5 mg








General: alert, demented


HEENT: NC/AT, PERRLA, EOMI, anicteric sclerae, throat clear


Neck: Supple, No JVD, No thyromegaly, +2 carotid pulse wo bruit, No LAD


Lungs: CTAB


Cardiovascular: Normal S1, Normal S2, without murmur


Abdomen: soft, non-tender, non-distended


Extremities: clear


Neurological: no change, gait stable





Internal Medicine Assmt/Plan





- Assessment


Assessment: 





1.DM.


2.HYPERLIPIDEMIA.


3.SEIZURE DISORDER


4.SCHIZO AFFECTIVE DISORDERS





- Plan


Plan: 





CONTINUE ON CURRENT MEDICATION AND DIET





Nutritional Asmnt/Malnutr-PDOC





- Dietary Evaluation


Malnutrition Findings (Please click <Entered> for more info): 








Nutritional Asmnt/Malnutrition                             Start:  20 13:

27


Text:                                                      Status: Complete    

  


Freq:                                                                          

  


Protocol:                                                                      

  


 Document     20 13:28  AMY  (Rec: 20 13:35  AY[facility] [email or web address])


 Nutritional Asmnt/Malnutrition


     Patient General Information


      Nutritional Screening                      Moderate Risk


      Diagnosis                                  Psychosis


      Pertinent Medical Hx/Surgical Hx           Diabetes mellitus, seizure


                                                 disorder, hyperlipidemia,


                                                 schizoaffective disorder


      Subjective Information                     Pt is a 49-year-old female


                                                 admitted on 03/10 d/t acting


                                                 out, agitated, and delusional.


                                                 Pt is eating an estimated 100


                                                 % of meals Per Meal/Nutrition


                                                 Activity Record. Dietary is


                                                 currently providing an


                                                 estimated 2017 kcals and 120


                                                 gm Pro (x2 days), per Pt PO


                                                 intake this is providing an


                                                 estimated 2017 kcals and 120gm


                                                 Pro to meet 100% kcal and 100


                                                 % Pro needs- adequate.


                                                 Visited pt. after lunch,


                                                 introduced myself, inquired


                                                 how she was eating. Pt. stated


                                                 she was eating just fine.


                                                 Spoke to pt. about their high


                                                 TG levels, pt. understood and


                                                 stated they are watching their


                                                 fat intake and reducing meat


                                                 consumption.


                                                 Recommendation to add a


                                                 cardiac diet due hx of


                                                 hyperlipidemia and labs 


                                                 (3/10). Pt. nurse Sara has


                                                 placed the order to change


                                                 diet per recommendation.


                                                 Anthropometrics


                                                 HT: 58


                                                 WT: 186 LB (84.55 kg)


                                                 ABW: 151 LB (68.64)


                                                 BMI: 28.28 (Overweight)


                                                 GI/ Skin Integrity


                                                 GI: WNL, Soft, Non-Tender


                                                 BM: 03/11 x1


                                                 I/O: 240/Not Noted


                                                 Skin: WNL, Intact


                                                 Josse: 21


                                                 Diet Order: [facility]


                                                 Estimated Energy Needs: (


                                                 Geriatric, ABW)


                                                 6904-2800 kcals (25-30 kcals/


                                                 kg)


                                                 69-82g Pro (1.0-1.2 g/kg)


                                                 1700- 2000 ml (25-30 ml/kg)


      Current Diet Order/ Nutrition Support      [facility]


      Pertinent Medications                      Maalox (PRN), Lipitor,


                                                 Tegretol, Colace, Glucotrol,


                                                 MOM, Glucophage, Theragran


      Pertinent Labs                             3/10: Glucose 122, , HDL


                                                 52, Na 124, Ca 8.1, Cl 91


     Nutritional Hx/Data


      Height                                     1.73 m


      Height (Calculated Centimeters)            172.7


      Current Weight (lbs)                       84.368 kg


      Weight (Calculated Kilograms)              84.4


      Weight (Calculated Grams)                  16766.2


      Ideal Body Weight                          140


      % Ideal Body Weight                        107


      Body Mass Index (BMI)                      28.3


      Weight Status                              Overweight


     GI Symptoms


      GI Symptoms                                None


      Last BM                                    


      Skin Integrity/Comment:                    Skin: WNL, Intact


                                                 Josse: 21


      Current %PO                                Good (%)


     Estimated Nutritional Goals


      BEE in Kcals:                              Adj wt of IBW


      Calories/Kcals/Kg                          25-30


      Kcals Calculated                           4163-9950


      Protein:                                   Adj wt of IBW


      Protein g/k.0-1.2


      Protein Calculated                         69-82


      Fluid: ml                                  1700- 2000 ml (25-30 ml/kg)


     Nutritional Problem


      1. Problem


       Problem                                   Altered nutrition related labs


       Etiology                                  labs r/t endocrine dysfunction


                                                 and history of hyperlipidemia


       Signs/Symptoms:                           aeb recent laboratory values,


                                                 Glucose 122, 


     Malnutrition Related to Morbid Obesity


      Malnutrition related to morbid obesity     No


     Intervention/Recommendation


      Comments                                   1. Recommendation to add a


                                                 cardiac diet to current diet


                                                 rx.


                                                 2. Continue antihyperglycemic


                                                 medications for glucose


                                                 control per MD order.


     Expected Outcomes/Goals


      Expected Outcomes/Goals                    1. PO intake to continue to


                                                 meet >75% of estimated


                                                 nutritional needs.


                                                 2.Monitor PO intake, wt,


                                                 nutrition related labs, and


                                                 skin integrity to trend WNL.


                                                 3. F/U as low risk in 7-10


                                                 days, 3/20-

## 2020-03-17 NOTE — PROGRESS NOTES
DATE:  03/17/2020



FOLLOWUP PROGRESS NOTE



SUBJECTIVE:  Case was discussed with staff of the patient, reviewed records. 

The patient continues to be paranoid, continues to have poor insight.  She is

tolerating increase in Zyprexa to 50 mg twice a day with no side effects, no

sedation, no nausea, no extrapyramidal symptoms.  Continues to be internally

preoccupied, isolating herself, pacing the hallway.  We will continue outpatient

group therapy, milieu therapy, and adjust medications as needed.





DD: 03/17/2020 11:34

DT: 03/17/2020 12:57

JOB# [id number]  [id number]

## 2020-03-18 NOTE — INTERNAL MEDICINE PROG NOTE
Internal Medicine Subjective





- Subjective


Service Date: 20


Patient seen and examined:: without staff (SHE FEELS WELL)


Patient is:: awake, verbal, ambulating, talking, confused


Per staff patient has:: no adverse event, eating well, confused, tolerating meds





Internal Medicine Objective





- Physical Exam


Vitals and I&O: 


 Vital Signs











Temp  97.8 F   20 20:01


 


Pulse  72   20 20:01


 


Resp  20   20 20:01


 


BP  127/85   20 20:01


 


Pulse Ox  98   20 20:01








 Intake & Output











 20





 06:59 18:59 06:59


 


Intake Total  900 120


 


Balance  900 120


 


Intake:   


 


  Oral  900 120


 


Other:   


 


  # Voids 3 3 2


 


  # Bowel Movements 0 1 0











Active Medications: 


Current Medications





Acetaminophen (Tylenol)  650 mg PO Q4H PRN


   PRN Reason: Pain (Mild 1-3)


   Stop: 20 21:58


Acetaminophen (Tylenol)  650 mg PO Q4HR PRN


   PRN Reason: Temp above 100


   Stop: 05/10/20 00:03


Al Hydrox/Mg Hydrox/Simethicone (Maalox)  30 ml PO Q4HR PRN


   PRN Reason: GI DISTRESS


   Stop: 05/10/20 00:03


Atorvastatin Calcium (Lipitor)  10 mg PO HS [facility]; Protocol


   Stop: 05/10/20 20:59


   Last Admin: 20 20:32 Dose:  10 mg


Carbamazepine (Tegretol)  200 mg PO TID [facility]; Protocol


   Stop: 05/10/20 08:59


   Last Admin: 20 20:36 Dose:  Not Given


Docusate Sodium (Colace)  100 mg PO BID [facility]


   Stop: 05/10/20 08:59


   Last Admin: 20 16:30 Dose:  100 mg


Fluphenazine HCl (Prolixin)  2.5 mg PO DAILY [facility]; Protocol


   Stop: 05/10/20 08:59


   Last Admin: 20 08:18 Dose:  2.5 mg


Glipizide (Glucotrol)  5 mg PO BID [facility]


   Stop: 05/10/20 08:59


   Last Admin: 20 16:30 Dose:  5 mg


Lorazepam (Ativan)  0.5 mg PO Q4HR [facility]; Protocol


   PRN Reason: Anxiety


   Stop: 20 13:07


Magnesium Hydroxide (Milk Of Magnesia)  30 ml PO HS PRN


   PRN Reason: Constipation


   Stop: 05/10/20 00:03


Metformin HCl (Glucophage)  1,000 mg PO BID [facility]


   Stop: 05/10/20 08:59


   Last Admin: 20 16:30 Dose:  1,000 mg


Multivitamins/Vitamin C (Theragran)  1 tab PO DAILY [facility]


   Stop: 05/10/20 08:59


   Last Admin: 20 08:18 Dose:  1 tab


Olanzapine (Zyprexa)  15 mg PO BID [facility]


   Stop: 05/15/20 16:59


   Last Admin: 20 16:30 Dose:  15 mg


Zolpidem Tartrate (Ambien)  5 mg PO HS PRN


   PRN Reason: Insomnia


   Stop: 20 11:16








General: alert, demented


HEENT: NC/AT, PERRLA, EOMI, anicteric sclerae, throat clear


Neck: Supple, No JVD, No thyromegaly, +2 carotid pulse wo bruit, No LAD


Lungs: CTAB


Cardiovascular: Normal S1, Normal S2, without murmur


Abdomen: soft, non-tender, non-distended


Extremities: clear


Neurological: no change, gait stable





Internal Medicine Assmt/Plan





- Assessment


Assessment: 





1.DM.


2.HYPERLIPIDEMIA.


3.SEIZURE DISORDER


4.SCHIZO AFFECTIVE DISORDERS





- Plan


Plan: 





CONTINUE ON CURRENT MEDICATION AND DIET





Nutritional Asmnt/Malnutr-PDOC





- Dietary Evaluation


Malnutrition Findings (Please click <Entered> for more info): 








Nutritional Asmnt/Malnutrition                             Start:  20 13:

27


Text:                                                      Status: Complete    

  


Freq:                                                                          

  


Protocol:                                                                      

  


 Document     20 13:28  AMY  (Rec: 20 13:35  AMY [id number])


 Nutritional Asmnt/Malnutrition


     Patient General Information


      Nutritional Screening                      Moderate Risk


      Diagnosis                                  Psychosis


      Pertinent Medical Hx/Surgical Hx           Diabetes mellitus, seizure


                                                 disorder, hyperlipidemia,


                                                 schizoaffective disorder


      Subjective Information                     Pt is a 49-year-old female


                                                 admitted on 03/10 d/t acting


                                                 out, agitated, and delusional.


                                                 Pt is eating an estimated 100


                                                 % of meals Per Meal/Nutrition


                                                 Activity Record. Dietary is


                                                 currently providing an


                                                 estimated 2017 kcals and 120


                                                 gm Pro (x2 days), per Pt PO


                                                 intake this is providing an


                                                 estimated 2017 kcals and 120gm


                                                 Pro to meet 100% kcal and 100


                                                 % Pro needs- adequate.


                                                 Visited pt. after lunch,


                                                 introduced myself, inquired


                                                 how she was eating. Pt. stated


                                                 she was eating just fine.


                                                 Spoke to pt. about their high


                                                 TG levels, pt. understood and


                                                 stated they are watching their


                                                 fat intake and reducing meat


                                                 consumption.


                                                 Recommendation to add a


                                                 cardiac diet due hx of


                                                 hyperlipidemia and labs 


                                                 (3/10). Pt. nurse Sara has


                                                 placed the order to change


                                                 diet per recommendation.


                                                 Anthropometrics


                                                 HT: 58


                                                 WT: 186 LB (84.55 kg)


                                                 ABW: 151 LB (68.64)


                                                 BMI: 28.28 (Overweight)


                                                 GI/ Skin Integrity


                                                 GI: WNL, Soft, Non-Tender


                                                 BM: 03/11 x1


                                                 I/O: 240/Not Noted


                                                 Skin: WNL, Intact


                                                 Josse: 21


                                                 Diet Order: [facility]


                                                 Estimated Energy Needs: (


                                                 Geriatric, ABW)


                                                 5560-2402 kcals (25-30 kcals/


                                                 kg)


                                                 69-82g Pro (1.0-1.2 g/kg)


                                                 1700- 2000 ml (25-30 ml/kg)


      Current Diet Order/ Nutrition Support      [facility]


      Pertinent Medications                      Maalox (PRN), Lipitor,


                                                 Tegretol, Colace, Glucotrol,


                                                 MOM, Glucophage, Theragran


      Pertinent Labs                             3/10: Glucose 122, , HDL


                                                 52, Na 124, Ca 8.1, Cl 91


     Nutritional Hx/Data


      Height                                     1.73 m


      Height (Calculated Centimeters)            172.7


      Current Weight (lbs)                       84.368 kg


      Weight (Calculated Kilograms)              84.4


      Weight (Calculated Grams)                  99948.2


      Ideal Body Weight                          140


      % Ideal Body Weight                        107


      Body Mass Index (BMI)                      28.3


      Weight Status                              Overweight


     GI Symptoms


      GI Symptoms                                None


      Last BM                                    


      Skin Integrity/Comment:                    Skin: WNL, Intact


                                                 Josse: 21


      Current %PO                                Good (%)


     Estimated Nutritional Goals


      BEE in Kcals:                              Adj wt of IBW


      Calories/Kcals/Kg                          25-30


      Kcals Calculated                           8423-9233


      Protein:                                   Adj wt of IBW


      Protein g/k.0-1.2


      Protein Calculated                         69-82


      Fluid: ml                                  1700- 2000 ml (25-30 ml/kg)


     Nutritional Problem


      1. Problem


       Problem                                   Altered nutrition related labs


       Etiology                                  labs r/t endocrine dysfunction


                                                 and history of hyperlipidemia


       Signs/Symptoms:                           aeb recent laboratory values,


                                                 Glucose 122, 


     Malnutrition Related to Morbid Obesity


      Malnutrition related to morbid obesity     No


     Intervention/Recommendation


      Comments                                   1. Recommendation to add a


                                                 cardiac diet to current diet


                                                 rx.


                                                 2. Continue antihyperglycemic


                                                 medications for glucose


                                                 control per MD order.


     Expected Outcomes/Goals


      Expected Outcomes/Goals                    1. PO intake to continue to


                                                 meet >75% of estimated


                                                 nutritional needs.


                                                 2.Monitor PO intake, wt,


                                                 nutrition related labs, and


                                                 skin integrity to trend WNL.


                                                 3. F/U as low risk in 7-10


                                                 days, 3/20-

## 2020-03-18 NOTE — PROGRESS NOTES
DATE:  03/18/2020



SUBJECTIVE:  Case was discussed with staff of the patient, reviewed records. 

The patient is refusing Tegretol.  When asked why she was not taking it, she has

no answers.  She is sleeping better, eating better, tolerating increase in

olanzapine to 50 mg twice a day with no side effects, no sedation, no nausea, no

extrapyramidal symptoms and she continues to be unpredictable, impulsive,

needing redirection, paranoid.  We will continue outpatient group therapy,

milieu therapy, and adjust medications as needed.





DD: 03/18/2020 11:29

DT: 03/18/2020 19:28

JOB# [id number]  [id number]

## 2020-03-19 NOTE — PROGRESS NOTES
DATE:  03/19/2020



FOLLOWUP PROGRESS NOTE



Case was discussed with staff of the patient, reviewed records.  The patient is

refusing part of her medication.  I talked with the nurse, she denied that she

says she is taking all her medication.  Continues to be unpredictable,

impulsive, paranoid, continues to have poor insight, unable to make safe plan

for self-care, needing redirection.  No side effects with the medication, no

sedation, no nausea, no extrapyramidal symptoms.  I encouraged the patient to

make sure she takes her medication and she states she has taken it and did not

give me any reason for her refusal.  We will continue outpatient group therapy,

milieu therapy, and adjust medications as needed.





DD: 03/19/2020 10:51

DT: 03/19/2020 12:36

[facility]# [id number]  [id number]

## 2020-03-19 NOTE — INTERNAL MEDICINE PROG NOTE
Internal Medicine Subjective





- Subjective


Service Date: 20


Patient seen and examined:: without staff (SHE FEELS WELL)


Patient is:: awake, verbal, ambulating, talking, confused


Per staff patient has:: no adverse event, eating well, confused, tolerating meds





Internal Medicine Objective





- Physical Exam


Vitals and I&O: 


 Vital Signs











Temp  98.1 F   20 14:00


 


Pulse  99   20 14:00


 


Resp  20   20 14:00


 


BP  132/75   20 14:00


 


Pulse Ox  99   20 14:00








 Intake & Output











 20





 06:59 18:59 06:59


 


Intake Total 240 1200 


 


Balance 240 1200 


 


Intake:   


 


  Oral 240 1080 


 


  Other  120 


 


Other:   


 


  # Voids 2 3 


 


  # Bowel Movements 0 1 











Active Medications: 


Current Medications





Acetaminophen (Tylenol)  650 mg PO Q4H PRN


   PRN Reason: Pain (Mild 1-3)


   Stop: 20 21:58


   Last Admin: 20 16:41 Dose:  650 mg


Acetaminophen (Tylenol)  650 mg PO Q4HR PRN


   PRN Reason: Temp above 100


   Stop: 05/10/20 00:03


Al Hydrox/Mg Hydrox/Simethicone (Maalox)  30 ml PO Q4HR PRN


   PRN Reason: GI DISTRESS


   Stop: 05/10/20 00:03


Atorvastatin Calcium (Lipitor)  10 mg PO HS [facility]; Protocol


   Stop: 05/10/20 20:59


   Last Admin: 20 20:32 Dose:  10 mg


Carbamazepine (Tegretol)  200 mg PO TID [facility]; Protocol


   Stop: 05/10/20 08:59


   Last Admin: 20 14:03 Dose:  Not Given


Docusate Sodium (Colace)  100 mg PO BID [facility]


   Stop: 05/10/20 08:59


   Last Admin: 20 16:25 Dose:  Not Given


Fluphenazine HCl (Prolixin)  2.5 mg PO DAILY [facility]; Protocol


   Stop: 05/10/20 08:59


   Last Admin: 20 08:17 Dose:  2.5 mg


Glipizide (Glucotrol)  5 mg PO BID [facility]


   Stop: 05/10/20 08:59


   Last Admin: 03/19/20 16:25 Dose:  5 mg


Lorazepam (Ativan)  0.5 mg PO Q4HR [facility]; Protocol


   PRN Reason: Anxiety


   Stop: 20 13:07


Magnesium Hydroxide (Milk Of Magnesia)  30 ml PO HS PRN


   PRN Reason: Constipation


   Stop: 05/10/20 00:03


Metformin HCl (Glucophage)  1,000 mg PO BID [facility]


   Stop: 05/10/20 08:59


   Last Admin: 20 16:25 Dose:  1,000 mg


Multivitamins/Vitamin C (Theragran)  1 tab PO DAILY [facility]


   Stop: 05/10/20 08:59


   Last Admin: 20 08:17 Dose:  1 tab


Olanzapine (Zyprexa)  15 mg PO BID [facility]


   Stop: 05/15/20 16:59


   Last Admin: 20 16:25 Dose:  15 mg


Zolpidem Tartrate (Ambien)  5 mg PO HS PRN


   PRN Reason: Insomnia


   Stop: 20 11:16


   Last Admin: 20 00:05 Dose:  5 mg








General: alert, demented


HEENT: NC/AT, PERRLA, EOMI, anicteric sclerae, throat clear


Neck: Supple, No JVD, No thyromegaly, +2 carotid pulse wo bruit, No LAD


Lungs: CTAB


Cardiovascular: Normal S1, Normal S2, without murmur


Abdomen: soft, non-tender, non-distended


Extremities: clear


Neurological: no change, gait stable





Internal Medicine Assmt/Plan





- Assessment


Assessment: 





1.DM.


2.HYPERLIPIDEMIA.


3.SEIZURE DISORDER


4.SCHIZO AFFECTIVE DISORDERS





- Plan


Plan: 





CONTINUE ON CURRENT MEDICATION AND DIET





Nutritional Asmnt/Malnutr-PDOC





- Dietary Evaluation


Malnutrition Findings (Please click <Entered> for more info): 








Nutritional Asmnt/Malnutrition                             Start:  20 13:

27


Text:                                                      Status: Complete    

  


Freq:                                                                          

  


Protocol:                                                                      

  


 Document     20 13:28  AMY  (Rec: 20 13:35  AMY [id number])


 Nutritional Asmnt/Malnutrition


     Patient General Information


      Nutritional Screening                      Moderate Risk


      Diagnosis                                  Psychosis


      Pertinent Medical Hx/Surgical Hx           Diabetes mellitus, seizure


                                                 disorder, hyperlipidemia,


                                                 schizoaffective disorder


      Subjective Information                     Pt is a 49-year-old female


                                                 admitted on 03/10 d/t acting


                                                 out, agitated, and delusional.


                                                 Pt is eating an estimated 100


                                                 % of meals Per Meal/Nutrition


                                                 Activity Record. Dietary is


                                                 currently providing an


                                                 estimated 2017 kcals and 120


                                                 gm Pro (x2 days), per Pt PO


                                                 intake this is providing an


                                                 estimated 2017 kcals and 120gm


                                                 Pro to meet 100% kcal and 100


                                                 % Pro needs- adequate.


                                                 Visited pt. after lunch,


                                                 introduced myself, inquired


                                                 how she was eating. Pt. stated


                                                 she was eating just fine.


                                                 Spoke to pt. about their high


                                                 TG levels, pt. understood and


                                                 stated they are watching their


                                                 fat intake and reducing meat


                                                 consumption.


                                                 Recommendation to add a


                                                 cardiac diet due hx of


                                                 hyperlipidemia and labs 


                                                 (3/10). Pt. nurse Sara has


                                                 placed the order to change


                                                 diet per recommendation.


                                                 Anthropometrics


                                                 HT: 58


                                                 WT: 186 LB (84.55 kg)


                                                 ABW: 151 LB (68.64)


                                                 BMI: 28.28 (Overweight)


                                                 GI/ Skin Integrity


                                                 GI: WNL, Soft, Non-Tender


                                                 BM: 03/11 x1


                                                 I/O: 240/Not Noted


                                                 Skin: WNL, Intact


                                                 Josse: 21


                                                 Diet Order: [facility]


                                                 Estimated Energy Needs: (


                                                 Geriatric, ABW)


                                                 9179-4393 kcals (25-30 kcals/


                                                 kg)


                                                 69-82g Pro (1.0-1.2 g/kg)


                                                 1700- 2000 ml (25-30 ml/kg)


      Current Diet Order/ Nutrition Support      [facility]


      Pertinent Medications                      Maalox (PRN), Lipitor,


                                                 Tegretol, Colace, Glucotrol,


                                                 MOM, Glucophage, Theragran


      Pertinent Labs                             3/10: Glucose 122, , HDL


                                                 52, Na 124, Ca 8.1, Cl 91


     Nutritional Hx/Data


      Height                                     1.73 m


      Height (Calculated Centimeters)            172.7


      Current Weight (lbs)                       84.368 kg


      Weight (Calculated Kilograms)              84.4


      Weight (Calculated Grams)                  75112.2


      Ideal Body Weight                          140


      % Ideal Body Weight                        107


      Body Mass Index (BMI)                      28.3


      Weight Status                              Overweight


     GI Symptoms


      GI Symptoms                                None


      Last BM                                    


      Skin Integrity/Comment:                    Skin: WNL, Intact


                                                 Josse: 21


      Current %PO                                Good (%)


     Estimated Nutritional Goals


      BEE in Kcals:                              Adj wt of IBW


      Calories/Kcals/Kg                          25-30


      Kcals Calculated                           8994-5484


      Protein:                                   Adj wt of IBW


      Protein g/k.0-1.2


      Protein Calculated                         69-82


      Fluid: ml                                  1700- 2000 ml (25-30 ml/kg)


     Nutritional Problem


      1. Problem


       Problem                                   Altered nutrition related labs


       Etiology                                  labs r/t endocrine dysfunction


                                                 and history of hyperlipidemia


       Signs/Symptoms:                           aeb recent laboratory values,


                                                 Glucose 122, 


     Malnutrition Related to Morbid Obesity


      Malnutrition related to morbid obesity     No


     Intervention/Recommendation


      Comments                                   1. Recommendation to add a


                                                 cardiac diet to current diet


                                                 rx.


                                                 2. Continue antihyperglycemic


                                                 medications for glucose


                                                 control per MD order.


     Expected Outcomes/Goals


      Expected Outcomes/Goals                    1. PO intake to continue to


                                                 meet >75% of estimated


                                                 nutritional needs.


                                                 2.Monitor PO intake, wt,


                                                 nutrition related labs, and


                                                 skin integrity to trend WNL.


                                                 3. F/U as low risk in 7-10


                                                 days, 3/20-

## 2020-03-20 RX ADMIN — INSULIN LISPRO SCH: 100 INJECTION, SOLUTION INTRAVENOUS; SUBCUTANEOUS at 20:38

## 2020-03-20 NOTE — INTERNAL MEDICINE PROG NOTE
Internal Medicine Subjective





- Subjective


Service Date: 20


Patient seen and examined:: without staff (SHE FEELS BETTER)


Patient is:: awake, verbal, ambulating, talking, confused


Per staff patient has:: no adverse event, eating well, confused, tolerating meds





Internal Medicine Objective





- Results


Recent Labs: 


 Laboratory Last Values











POC Glucose  162 MG/DL (70 - 105)  H  20  16:25    














- Physical Exam


Vitals and I&O: 


 Vital Signs











Temp  98 F   20 14:00


 


Pulse  90   20 14:00


 


Resp  18   20 14:00


 


BP  128/79   20 14:00


 


Pulse Ox  98   20 14:00








 Intake & Output











 20





 18:59 06:59 18:59


 


Intake Total 1200 360 


 


Balance 1200 360 


 


Intake:   


 


  Oral 1080 360 


 


  Other 120  


 


Other:   


 


  # Voids 3 1 


 


  # Bowel Movements 1 0 











Active Medications: 


Current Medications





Acetaminophen (Tylenol)  650 mg PO Q4H PRN


   PRN Reason: Pain (Mild 1-3)


   Stop: 20 21:58


   Last Admin: 20 16:41 Dose:  650 mg


Acetaminophen (Tylenol)  650 mg PO Q4HR PRN


   PRN Reason: Temp above 100


   Stop: 05/10/20 00:03


Al Hydrox/Mg Hydrox/Simethicone (Maalox)  30 ml PO Q4HR PRN


   PRN Reason: GI DISTRESS


   Stop: 05/10/20 00:03


Atorvastatin Calcium (Lipitor)  10 mg PO HS [facility]; Protocol


   Stop: 05/10/20 20:59


   Last Admin: 20 20:51 Dose:  10 mg


Carbamazepine (Tegretol)  200 mg PO TID [facility]; Protocol


   Stop: 05/10/20 08:59


   Last Admin: 20 17:26 Dose:  200 mg


Docusate Sodium (Colace)  100 mg PO BID [facility]


   Stop: 05/10/20 08:59


   Last Admin: 20 08:29 Dose:  Not Given


Fluphenazine HCl (Prolixin)  5 mg PO DAILY [facility]; Protocol


   Stop: 20 08:59


Glipizide (Glucotrol)  5 mg PO BID [facility]


   Stop: 05/10/20 08:59


   Last Admin: 20 16:39 Dose:  Not Given


Lorazepam (Ativan)  0.5 mg PO Q4HR PRN; Protocol


   PRN Reason: Anxiety


   Stop: 20 13:07


Magnesium Hydroxide (Milk Of Magnesia)  30 ml PO HS PRN


   PRN Reason: Constipation


   Stop: 05/10/20 00:03


Metformin HCl (Glucophage)  1,000 mg PO BID [facility]


   Stop: 05/10/20 08:59


   Last Admin: 20 16:38 Dose:  1,000 mg


Multivitamins/Vitamin C (Theragran)  1 tab PO DAILY [facility]


   Stop: 05/10/20 08:59


   Last Admin: 20 08:28 Dose:  1 tab


Olanzapine (Zyprexa)  15 mg PO BID [facility]


   Stop: 05/15/20 16:59


   Last Admin: 20 16:39 Dose:  15 mg


Zolpidem Tartrate (Ambien)  5 mg PO HS PRN


   PRN Reason: Insomnia


   Stop: 20 11:16


   Last Admin: 20 20:52 Dose:  5 mg








General: alert, demented


HEENT: NC/AT, PERRLA, EOMI, anicteric sclerae, throat clear


Neck: Supple, No JVD, No thyromegaly, +2 carotid pulse wo bruit, No LAD


Lungs: CTAB


Cardiovascular: Normal S1, Normal S2, without murmur


Abdomen: soft, non-tender, non-distended


Extremities: clear


Neurological: no change, gait stable





Internal Medicine Assmt/Plan





- Assessment


Assessment: 





1.DM.


2.HYPERLIPIDEMIA.


3.SEIZURE DISORDER


4.SCHIZO AFFECTIVE DISORDERS





- Plan


Plan: 





CONTINUE ON CURRENT MEDICATION AND DIET





Nutritional Asmnt/Malnutr-PDOC





- Dietary Evaluation


Malnutrition Findings (Please click <Entered> for more info): 








Nutritional Asmnt/Malnutrition                             Start:  20 13:

27


Text:                                                      Status: Complete    

  


Freq:                                                                          

  


Protocol:                                                                      

  


 Document     20 13:28  AMY  (Rec: 20 13:35  AMY [id number])


 Nutritional Asmnt/Malnutrition


     Patient General Information


      Nutritional Screening                      Moderate Risk


      Diagnosis                                  Psychosis


      Pertinent Medical Hx/Surgical Hx           Diabetes mellitus, seizure


                                                 disorder, hyperlipidemia,


                                                 schizoaffective disorder


      Subjective Information                     Pt is a 49-year-old female


                                                 admitted on 03/10 d/t acting


                                                 out, agitated, and delusional.


                                                 Pt is eating an estimated 100


                                                 % of meals Per Meal/Nutrition


                                                 Activity Record. Dietary is


                                                 currently providing an


                                                 estimated 2017 kcals and 120


                                                 gm Pro (x2 days), per Pt PO


                                                 intake this is providing an


                                                 estimated 2017 kcals and 120gm


                                                 Pro to meet 100% kcal and 100


                                                 % Pro needs- adequate.


                                                 Visited pt. after lunch,


                                                 introduced myself, inquired


                                                 how she was eating. Pt. stated


                                                 she was eating just fine.


                                                 Spoke to pt. about their high


                                                 TG levels, pt. understood and


                                                 stated they are watching their


                                                 fat intake and reducing meat


                                                 consumption.


                                                 Recommendation to add a


                                                 cardiac diet due hx of


                                                 hyperlipidemia and labs 


                                                 (3/10). Pt. nurse Sara has


                                                 placed the order to change


                                                 diet per recommendation.


                                                 Anthropometrics


                                                 HT: 58


                                                 WT: 186 LB (84.55 kg)


                                                 ABW: 151 LB (68.64)


                                                 BMI: 28.28 (Overweight)


                                                 GI/ Skin Integrity


                                                 GI: WNL, Soft, Non-Tender


                                                 BM: 03/11 x1


                                                 I/O: 240/Not Noted


                                                 Skin: WNL, Intact


                                                 Josse: 21


                                                 Diet Order: [facility]


                                                 Estimated Energy Needs: (


                                                 Geriatric, ABW)


                                                 4899-2353 kcals (25-30 kcals/


                                                 kg)


                                                 69-82g Pro (1.0-1.2 g/kg)


                                                 1700- 2000 ml (25-30 ml/kg)


      Current Diet Order/ Nutrition Support      [facility]


      Pertinent Medications                      Maalox (PRN), Lipitor,


                                                 Tegretol, Colace, Glucotrol,


                                                 MOM, Glucophage, Theragran


      Pertinent Labs                             3/10: Glucose 122, , HDL


                                                 52, Na 124, Ca 8.1, Cl 91


     Nutritional Hx/Data


      Height                                     1.73 m


      Height (Calculated Centimeters)            172.7


      Current Weight (lbs)                       84.368 kg


      Weight (Calculated Kilograms)              84.4


      Weight (Calculated Grams)                  51273.2


      Ideal Body Weight                          140


      % Ideal Body Weight                        107


      Body Mass Index (BMI)                      28.3


      Weight Status                              Overweight


     GI Symptoms


      GI Symptoms                                None


      Last BM                                    


      Skin Integrity/Comment:                    Skin: WNL, Intact


                                                 Josse: 21


      Current %PO                                Good (%)


     Estimated Nutritional Goals


      BEE in Kcals:                              Adj wt of IBW


      Calories/Kcals/Kg                          25-30


      Kcals Calculated                           7041-0608


      Protein:                                   Adj wt of IBW


      Protein g/k.0-1.2


      Protein Calculated                         69-82


      Fluid: ml                                  1700- 2000 ml (25-30 ml/kg)


     Nutritional Problem


      1. Problem


       Problem                                   Altered nutrition related labs


       Etiology                                  labs r/t endocrine dysfunction


                                                 and history of hyperlipidemia


       Signs/Symptoms:                           aeb recent laboratory values,


                                                 Glucose 122, 


     Malnutrition Related to Morbid Obesity


      Malnutrition related to morbid obesity     No


     Intervention/Recommendation


      Comments                                   1. Recommendation to add a


                                                 cardiac diet to current diet


                                                 rx.


                                                 2. Continue antihyperglycemic


                                                 medications for glucose


                                                 control per MD order.


     Expected Outcomes/Goals


      Expected Outcomes/Goals                    1. PO intake to continue to


                                                 meet >75% of estimated


                                                 nutritional needs.


                                                 2.Monitor PO intake, wt,


                                                 nutrition related labs, and


                                                 skin integrity to trend WNL.


                                                 3. F/U as low risk in 7-10


                                                 days, 3/20-

## 2020-03-20 NOTE — PROGRESS NOTES
DATE:     03/20/2020



Case was discussed with staff of the patient.  The patient is refusing Tegretol.

 She thinks there is something , I do not know that she meant.  However,

I will be increasing her Prolixin to 5 mg daily.  I also increase her Zyprexa to

50 mg twice a day.  She is compliant with those medications with no side

effects, no sedation, no nausea, and no extrapyramidal symptoms.  We will

continue outpatient group therapy, milieu therapy, and adjust medications as

needed.





DD: 03/20/2020 10:29

DT: 03/20/2020 13:36

JOB# [id number]  [id number]

MTDD

## 2020-03-21 RX ADMIN — INSULIN LISPRO SCH: 100 INJECTION, SOLUTION INTRAVENOUS; SUBCUTANEOUS at 20:32

## 2020-03-21 RX ADMIN — INSULIN LISPRO SCH: 100 INJECTION, SOLUTION INTRAVENOUS; SUBCUTANEOUS at 16:52

## 2020-03-21 RX ADMIN — INSULIN LISPRO SCH: 100 INJECTION, SOLUTION INTRAVENOUS; SUBCUTANEOUS at 12:00

## 2020-03-21 RX ADMIN — INSULIN LISPRO SCH UNITS: 100 INJECTION, SOLUTION INTRAVENOUS; SUBCUTANEOUS at 06:41

## 2020-03-21 NOTE — INTERNAL MEDICINE PROG NOTE
Internal Medicine Subjective





- Subjective


Service Date: 20


Patient seen and examined:: without staff (she feels better)


Patient is:: awake, verbal, ambulating, talking, confused


Per staff patient has:: no adverse event, eating well, confused, tolerating meds





Internal Medicine Objective





- Results


Recent Labs: 


 Laboratory Last Values











POC Glucose  116 MG/DL (70 - 105)  H  20  20:23    














- Physical Exam


Vitals and I&O: 


 Vital Signs











Temp  97 F   20 14:17


 


Pulse  117   20 14:17


 


Resp  20   20 14:17


 


BP  149/94   20 14:17


 


Pulse Ox  96   20 14:17








 Intake & Output











 20





 06:59 18:59 06:59


 


Intake Total 360  240


 


Balance 360  240


 


Intake:   


 


  Oral 360  240


 


Other:   


 


  # Voids 2 3 1


 


  # Bowel Movements 0 1 


 


  Stool Characteristics  Formed 











Active Medications: 


Current Medications





Acetaminophen (Tylenol)  650 mg PO Q4H PRN


   PRN Reason: Pain (Mild 1-3)


   Stop: 20 21:58


   Last Admin: 20 16:41 Dose:  650 mg


Acetaminophen (Tylenol)  650 mg PO Q4HR PRN


   PRN Reason: Temp above 100


   Stop: 05/10/20 00:03


Al Hydrox/Mg Hydrox/Simethicone (Maalox)  30 ml PO Q4HR PRN


   PRN Reason: GI DISTRESS


   Stop: 05/10/20 00:03


Atorvastatin Calcium (Lipitor)  10 mg PO HS On license of [facility]; Protocol


   Stop: 05/10/20 20:59


   Last Admin: 20 20:57 Dose:  10 mg


Carbamazepine (Tegretol)  200 mg PO TID On license of [facility]; Protocol


   Stop: 05/10/20 08:59


   Last Admin: 20 20:57 Dose:  200 mg


Dextrose (Glutose 40%)  18.75 gm PO PRN PRN


   PRN Reason: Blood Glucose less than 70


   Stop: 20 18:08


Docusate Sodium (Colace)  100 mg PO BID On license of [facility]


   Stop: 05/10/20 08:59


   Last Admin: 20 17:08 Dose:  Not Given


Fluphenazine HCl (Prolixin)  5 mg PO DAILY On license of [facility]; Protocol


   Stop: 20 08:59


   Last Admin: 20 09:51 Dose:  5 mg


Glipizide (Glucotrol)  5 mg PO BID On license of [facility]


   Stop: 05/10/20 08:59


   Last Admin: 20 17:08 Dose:  5 mg


Glucagon (Glucagen)  1 mg IM PRN PRN


   PRN Reason: Blood Glucose less than 70


   Stop: 20 18:08


Insulin Human Lispro (Humalog Insulin Sliding Scale)  0 units SUBQ ACHS On license of [facility]; 

Protocol


   Stop: 20 20:59


   Last Admin: 20 20:32 Dose:  Not Given


Lorazepam (Ativan)  0.5 mg PO Q4HR PRN; Protocol


   PRN Reason: Anxiety


   Stop: 20 13:07


Magnesium Hydroxide (Milk Of Magnesia)  30 ml PO HS PRN


   PRN Reason: Constipation


   Stop: 05/10/20 00:03


Metformin HCl (Glucophage)  1,000 mg PO BID [facility]


   Stop: 05/10/20 08:59


   Last Admin: 20 17:08 Dose:  1,000 mg


Multivitamins/Vitamin C (Theragran)  1 tab PO DAILY On license of [facility]


   Stop: 05/10/20 08:59


   Last Admin: 20 09:52 Dose:  1 tab


Olanzapine (Zyprexa)  15 mg PO BID On license of [facility]


   Stop: 05/15/20 16:59


   Last Admin: 20 17:09 Dose:  15 mg


Zolpidem Tartrate (Ambien)  5 mg PO HS PRN


   PRN Reason: Insomnia


   Stop: 20 11:16


   Last Admin: 20 20:52 Dose:  5 mg








General: alert, demented


HEENT: NC/AT, PERRLA, EOMI, anicteric sclerae, throat clear


Neck: Supple, No JVD, No thyromegaly, +2 carotid pulse wo bruit, No LAD


Lungs: CTAB


Cardiovascular: Normal S1, Normal S2, without murmur


Abdomen: soft, non-tender, non-distended


Extremities: clear


Neurological: no change, gait stable





Internal Medicine Assmt/Plan





- Assessment


Assessment: 





1.DM.


2.HYPERLIPIDEMIA.


3.SEIZURE DISORDER


4.SCHIZO AFFECTIVE DISORDERS





- Plan


Plan: 





CONTINUE ON CURRENT MEDICATION AND DIET





Nutritional Asmnt/Malnutr-PDOC





- Dietary Evaluation


Malnutrition Findings (Please click <Entered> for more info): 








Nutritional Asmnt/Malnutrition                             Start:  20 13:

27


Text:                                                      Status: Complete    

  


Freq:                                                                          

  


Protocol:                                                                      

  


 Document     20 13:28  AMY  (Rec: 20 13:35  AMY DRIVER-FNS1)


 Nutritional Asmnt/Malnutrition


     Patient General Information


      Nutritional Screening                      Moderate Risk


      Diagnosis                                  Psychosis


      Pertinent Medical Hx/Surgical Hx           Diabetes mellitus, seizure


                                                 disorder, hyperlipidemia,


                                                 schizoaffective disorder


      Subjective Information                     Pt is a 49-year-old female


                                                 admitted on 03/10 d/t acting


                                                 out, agitated, and delusional.


                                                 Pt is eating an estimated 100


                                                 % of meals Per Meal/Nutrition


                                                 Activity Record. Dietary is


                                                 currently providing an


                                                 estimated 2017 kcals and 120


                                                 gm Pro (x2 days), per Pt PO


                                                 intake this is providing an


                                                 estimated 2017 kcals and 120gm


                                                 Pro to meet 100% kcal and 100


                                                 % Pro needs- adequate.


                                                 Visited pt. after lunch,


                                                 introduced myself, inquired


                                                 how she was eating. Pt. stated


                                                 she was eating just fine.


                                                 Spoke to pt. about their high


                                                 TG levels, pt. understood and


                                                 stated they are watching their


                                                 fat intake and reducing meat


                                                 consumption.


                                                 Recommendation to add a


                                                 cardiac diet due hx of


                                                 hyperlipidemia and labs 


                                                 (3/10). Pt. nurse Sara has


                                                 placed the order to change


                                                 diet per recommendation.


                                                 Anthropometrics


                                                 HT: 58


                                                 WT: 186 LB (84.55 kg)


                                                 ABW: 151 LB (68.64)


                                                 BMI: 28.28 (Overweight)


                                                 GI/ Skin Integrity


                                                 GI: WNL, Soft, Non-Tender


                                                 BM: 03/11 x1


                                                 I/O: 240/Not Noted


                                                 Skin: WNL, Intact


                                                 Josse: 21


                                                 Diet Order: [facility]


                                                 Estimated Energy Needs: (


                                                 Geriatric, ABW)


                                                 9898-5606 kcals (25-30 kcals/


                                                 kg)


                                                 69-82g Pro (1.0-1.2 g/kg)


                                                 1700- 2000 ml (25-30 ml/kg)


      Current Diet Order/ Nutrition Support      [facility]


      Pertinent Medications                      Maalox (PRN), Lipitor,


                                                 Tegretol, Colace, Glucotrol,


                                                 MOM, Glucophage, Theragran


      Pertinent Labs                             3/10: Glucose 122, , HDL


                                                 52, Na 124, Ca 8.1, Cl 91


     Nutritional Hx/Data


      Height                                     1.73 m


      Height (Calculated Centimeters)            172.7


      Current Weight (lbs)                       84.368 kg


      Weight (Calculated Kilograms)              84.4


      Weight (Calculated Grams)                  25877.2


      Ideal Body Weight                          140


      % Ideal Body Weight                        107


      Body Mass Index (BMI)                      28.3


      Weight Status                              Overweight


     GI Symptoms


      GI Symptoms                                None


      Last BM                                    


      Skin Integrity/Comment:                    Skin: WNL, Intact


                                                 Josse: 21


      Current %PO                                Good (%)


     Estimated Nutritional Goals


      BEE in Kcals:                              Adj wt of IBW


      Calories/Kcals/Kg                          25-30


      Kcals Calculated                           0234-6044


      Protein:                                   Adj wt of IBW


      Protein g/k.0-1.2


      Protein Calculated                         69-82


      Fluid: ml                                  1700- 2000 ml (25-30 ml/kg)


     Nutritional Problem


      1. Problem


       Problem                                   Altered nutrition related labs


       Etiology                                  labs r/t endocrine dysfunction


                                                 and history of hyperlipidemia


       Signs/Symptoms:                           aeb recent laboratory values,


                                                 Glucose 122, 


     Malnutrition Related to Morbid Obesity


      Malnutrition related to morbid obesity     No


     Intervention/Recommendation


      Comments                                   1. Recommendation to add a


                                                 cardiac diet to current diet


                                                 rx.


                                                 2. Continue antihyperglycemic


                                                 medications for glucose


                                                 control per MD order.


     Expected Outcomes/Goals


      Expected Outcomes/Goals                    1. PO intake to continue to


                                                 meet >75% of estimated


                                                 nutritional needs.


                                                 2.Monitor PO intake, wt,


                                                 nutrition related labs, and


                                                 skin integrity to trend WNL.


                                                 3. F/U as low risk in 7-10


                                                 days, 3/20-

## 2020-03-22 RX ADMIN — INSULIN LISPRO SCH: 100 INJECTION, SOLUTION INTRAVENOUS; SUBCUTANEOUS at 12:04

## 2020-03-22 RX ADMIN — INSULIN LISPRO SCH UNITS: 100 INJECTION, SOLUTION INTRAVENOUS; SUBCUTANEOUS at 17:27

## 2020-03-22 RX ADMIN — INSULIN LISPRO SCH UNITS: 100 INJECTION, SOLUTION INTRAVENOUS; SUBCUTANEOUS at 21:30

## 2020-03-22 RX ADMIN — INSULIN LISPRO SCH UNITS: 100 INJECTION, SOLUTION INTRAVENOUS; SUBCUTANEOUS at 06:54

## 2020-03-22 NOTE — PROGRESS NOTES
DATE:  03/21/2020



SUBJECTIVE:  The patient was seen and evaluated.  The patient's chart was

reviewed.



Covering for Dr. Forbes.



Today on face-to-face evaluation, the patient reports "I am 911, I am

[place]."



IDENTIFYING DATA:  A 49-year-old female brought in here from [facility], delusional, believing that everyone is against her.



HOSPITAL COURSE:  Reconciliation reviewed.  Tegretol, Prolixin, metformin,

Zyprexa.



ASSESSMENT AND PLAN:  The patient continues to be paranoid, incoherent

statements about 911 and she is [place].  We will continue with primary

psychiatric treatment plan and goals.  Her Zyprexa is currently being augmented

with Prolixin.  No EPS.  No tardive dyskinesia symptoms endorsed.





DD: 03/21/2020 12:26

DT: 03/21/2020 19:41

JOB# [id number]  [id number]

## 2020-03-22 NOTE — INTERNAL MEDICINE PROG NOTE
Internal Medicine Subjective





- Subjective


Service Date: 20


Patient seen and examined:: without staff (SHE FEELS WELL)


Patient is:: awake, verbal, ambulating, talking, confused


Per staff patient has:: no adverse event, eating well, confused, tolerating meds





Internal Medicine Objective





- Results


Recent Labs: 


 Laboratory Last Values











POC Glucose  155 MG/DL (70 - 105)  H  20  20:09    














- Physical Exam


Vitals and I&O: 


 Vital Signs











Temp  97.7 F   20 20:00


 


Pulse  81   20 20:00


 


Resp  19   20 20:00


 


BP  125/83   20 20:00


 


Pulse Ox  97   20 20:00








 Intake & Output











 20





 06:59 18:59 06:59


 


Intake Total 300 1200 


 


Balance 300 1200 


 


Intake:   


 


  Oral 300 1200 


 


Other:   


 


  # Voids 2 4 


 


  # Bowel Movements 0 1 


 


  Stool Characteristics Soft  





 Formed  





 Brown  











Active Medications: 


Current Medications





Acetaminophen (Tylenol)  650 mg PO Q4H PRN


   PRN Reason: Pain (Mild 1-3)


   Stop: 20 21:58


   Last Admin: 20 16:41 Dose:  650 mg


Acetaminophen (Tylenol)  650 mg PO Q4HR PRN


   PRN Reason: Temp above 100


   Stop: 05/10/20 00:03


Al Hydrox/Mg Hydrox/Simethicone (Maalox)  30 ml PO Q4HR PRN


   PRN Reason: GI DISTRESS


   Stop: 05/10/20 00:03


Atorvastatin Calcium (Lipitor)  10 mg PO HS [facility]; Protocol


   Stop: 05/10/20 20:59


   Last Admin: 20 20:36 Dose:  10 mg


Carbamazepine (Tegretol)  200 mg PO TID [facility]; Protocol


   Stop: 05/10/20 08:59


   Last Admin: 20 20:36 Dose:  200 mg


Dextrose (Glutose 40%)  18.75 gm PO PRN PRN


   PRN Reason: Blood Glucose less than 70


   Stop: 20 18:08


Docusate Sodium (Colace)  100 mg PO BID [facility]


   Stop: 05/10/20 08:59


   Last Admin: 20 17:24 Dose:  Not Given


Fluphenazine HCl (Prolixin)  5 mg PO DAILY [facility]; Protocol


   Stop: 20 08:59


   Last Admin: 20 08:34 Dose:  5 mg


Glipizide (Glucotrol)  5 mg PO BID [facility]


   Stop: 05/10/20 08:59


   Last Admin: 20 17:26 Dose:  5 mg


Glucagon (Glucagen)  1 mg IM PRN PRN


   PRN Reason: Blood Glucose less than 70


   Stop: 20 18:08


Insulin Human Lispro (Humalog Insulin Sliding Scale)  0 units SUBQ ACHS [facility]; 

Protocol


   Stop: 20 20:59


   Last Admin: 20 17:27 Dose:  2 units


Lorazepam (Ativan)  0.5 mg PO Q4HR PRN; Protocol


   PRN Reason: Anxiety


   Stop: 20 13:07


   Last Admin: 20 20:36 Dose:  0.5 mg


Magnesium Hydroxide (Milk Of Magnesia)  30 ml PO HS PRN


   PRN Reason: Constipation


   Stop: 05/10/20 00:03


Metformin HCl (Glucophage)  1,000 mg PO BID [facility]


   Stop: 05/10/20 08:59


   Last Admin: 20 17:26 Dose:  1,000 mg


Multivitamins/Vitamin C (Theragran)  1 tab PO DAILY [facility]


   Stop: 05/10/20 08:59


   Last Admin: 20 08:35 Dose:  1 tab


Olanzapine (Zyprexa)  15 mg PO BID [facility]


   Stop: 05/15/20 16:59


   Last Admin: 20 17:27 Dose:  15 mg


Zolpidem Tartrate (Ambien)  5 mg PO HS PRN


   PRN Reason: Insomnia


   Stop: 20 11:16


   Last Admin: 20 20:52 Dose:  5 mg








General: alert, demented


HEENT: NC/AT, PERRLA, EOMI, anicteric sclerae, throat clear


Neck: Supple, No JVD, No thyromegaly, +2 carotid pulse wo bruit, No LAD


Lungs: CTAB


Cardiovascular: Normal S1, Normal S2, without murmur


Abdomen: soft, non-tender, non-distended


Extremities: clear


Neurological: no change, gait stable





Internal Medicine Assmt/Plan





- Assessment


Assessment: 





1.DM.


2.HYPERLIPIDEMIA.


3.SEIZURE DISORDER


4.SCHIZO AFFECTIVE DISORDERS





- Plan


Plan: 





CONTINUE ON CURRENT MEDICATION AND DIET





Nutritional Asmnt/Malnutr-PDOC





- Dietary Evaluation


Malnutrition Findings (Please click <Entered> for more info): 








Nutritional Asmnt/Malnutrition                             Start:  20 13:

27


Text:                                                      Status: Complete    

  


Freq:                                                                          

  


Protocol:                                                                      

  


 Document     20 13:28  AMY  (Rec: 20 13:35  AMY DRIVER-FNS1)


 Nutritional Asmnt/Malnutrition


     Patient General Information


      Nutritional Screening                      Moderate Risk


      Diagnosis                                  Psychosis


      Pertinent Medical Hx/Surgical Hx           Diabetes mellitus, seizure


                                                 disorder, hyperlipidemia,


                                                 schizoaffective disorder


      Subjective Information                     Pt is a 49-year-old female


                                                 admitted on 03/10 d/t acting


                                                 out, agitated, and delusional.


                                                 Pt is eating an estimated 100


                                                 % of meals Per Meal/Nutrition


                                                 Activity Record. Dietary is


                                                 currently providing an


                                                 estimated 2017 kcals and 120


                                                 gm Pro (x2 days), per Pt PO


                                                 intake this is providing an


                                                 estimated 2017 kcals and 120gm


                                                 Pro to meet 100% kcal and 100


                                                 % Pro needs- adequate.


                                                 Visited pt. after lunch,


                                                 introduced myself, inquired


                                                 how she was eating. Pt. stated


                                                 she was eating just fine.


                                                 Spoke to pt. about their high


                                                 TG levels, pt. understood and


                                                 stated they are watching their


                                                 fat intake and reducing meat


                                                 consumption.


                                                 Recommendation to add a


                                                 cardiac diet due hx of


                                                 hyperlipidemia and labs 


                                                 (3/10). Pt. nurse Sara has


                                                 placed the order to change


                                                 diet per recommendation.


                                                 Anthropometrics


                                                 HT: 58


                                                 WT: 186 LB (84.55 kg)


                                                 ABW: 151 LB (68.64)


                                                 BMI: 28.28 (Overweight)


                                                 GI/ Skin Integrity


                                                 GI: WNL, Soft, Non-Tender


                                                 BM: 03/11 x1


                                                 I/O: 240/Not Noted


                                                 Skin: WNL, Intact


                                                 Josse: 21


                                                 Diet Order: [facility]


                                                 Estimated Energy Needs: (


                                                 Geriatric, ABW)


                                                 1939-4907 kcals (25-30 kcals/


                                                 kg)


                                                 69-82g Pro (1.0-1.2 g/kg)


                                                 1700- 2000 ml (25-30 ml/kg)


      Current Diet Order/ Nutrition Support      [facility]


      Pertinent Medications                      Maalox (PRN), Lipitor,


                                                 Tegretol, Colace, Glucotrol,


                                                 MOM, Glucophage, Theragran


      Pertinent Labs                             3/10: Glucose 122, , HDL


                                                 52, Na 124, Ca 8.1, Cl 91


     Nutritional Hx/Data


      Height                                     1.73 m


      Height (Calculated Centimeters)            172.7


      Current Weight (lbs)                       84.368 kg


      Weight (Calculated Kilograms)              84.4


      Weight (Calculated Grams)                  93802.2


      Ideal Body Weight                          140


      % Ideal Body Weight                        107


      Body Mass Index (BMI)                      28.3


      Weight Status                              Overweight


     GI Symptoms


      GI Symptoms                                None


      Last BM                                    


      Skin Integrity/Comment:                    Skin: WNL, Intact


                                                 Josse: 21


      Current %PO                                Good (%)


     Estimated Nutritional Goals


      BEE in Kcals:                              Adj wt of IBW


      Calories/Kcals/Kg                          25-30


      Kcals Calculated                           1315-2862


      Protein:                                   Adj wt of IBW


      Protein g/k.0-1.2


      Protein Calculated                         69-82


      Fluid: ml                                  1700- 2000 ml (25-30 ml/kg)


     Nutritional Problem


      1. Problem


       Problem                                   Altered nutrition related labs


       Etiology                                  labs r/t endocrine dysfunction


                                                 and history of hyperlipidemia


       Signs/Symptoms:                           aeb recent laboratory values,


                                                 Glucose 122, 


     Malnutrition Related to Morbid Obesity


      Malnutrition related to morbid obesity     No


     Intervention/Recommendation


      Comments                                   1. Recommendation to add a


                                                 cardiac diet to current diet


                                                 rx.


                                                 2. Continue antihyperglycemic


                                                 medications for glucose


                                                 control per MD order.


     Expected Outcomes/Goals


      Expected Outcomes/Goals                    1. PO intake to continue to


                                                 meet >75% of estimated


                                                 nutritional needs.


                                                 2.Monitor PO intake, wt,


                                                 nutrition related labs, and


                                                 skin integrity to trend WNL.


                                                 3. F/U as low risk in 7-10


                                                 days, 3/20-

## 2020-03-22 NOTE — PROGRESS NOTES
DATE:  03/22/2020



Covering for  ____.



SUBJECTIVE:  Today on face-to-face evaluation, the patient reported that she

continues to be she is "[place]."



MENTAL STATUS EXAMINATION:  Disorganized.  She believes that she is [place].



ASSESSMENT AND PLAN:  Paranoid, incoherent statements.  No complications and

side effects noted by the patient.





DD: 03/22/2020 07:25

DT: 03/22/2020 08:25

JOB# [id number]  [id number]

## 2020-03-23 RX ADMIN — INSULIN LISPRO SCH: 100 INJECTION, SOLUTION INTRAVENOUS; SUBCUTANEOUS at 11:32

## 2020-03-23 RX ADMIN — INSULIN LISPRO SCH: 100 INJECTION, SOLUTION INTRAVENOUS; SUBCUTANEOUS at 17:07

## 2020-03-23 RX ADMIN — INSULIN LISPRO SCH UNITS: 100 INJECTION, SOLUTION INTRAVENOUS; SUBCUTANEOUS at 06:31

## 2020-03-23 RX ADMIN — INSULIN LISPRO SCH UNITS: 100 INJECTION, SOLUTION INTRAVENOUS; SUBCUTANEOUS at 20:47

## 2020-03-23 NOTE — PROGRESS NOTES
DATE:     03/23/2020



Case was discussed with staff of the patient, reviewed records.  The patient

continues to be delusional, disorganized.  She believes she is in [facility].  
She is

paranoid.  Refused to take some medications sometimes.  She is sleeping better,

eating better.  No side effects with the medication.  No sedation, no nausea, no

extrapyramidal symptoms.  refuses Tegretol.  I did increase Fluphenazine 5 mg

daily.  Also I increased olanzapine to 50 mg twice a day.  I will be increasing

the Fluphenazine to 10 mg a day to help improve her psychotic symptoms.  No side

effects with the medication.  No sedation, no nausea, no extrapyramidal

symptoms.  We will continue outpatient group therapy, milieu therapy, adjust

medication as needed.





DD: 03/23/2020 12:42

DT: 03/23/2020 20:39

JOB# [id number]  [id number]

MTDD

## 2020-03-23 NOTE — INTERNAL MEDICINE PROG NOTE
Internal Medicine Subjective





- Subjective


Service Date: 20


Patient seen and examined:: without staff (SHE FEELS WELL)


Patient is:: awake, verbal, ambulating, talking, confused


Per staff patient has:: no adverse event, eating well, confused, tolerating meds





Internal Medicine Objective





- Results


Recent Labs: 


 Laboratory Last Values











POC Glucose  119 MG/DL (70 - 105)  H  20  16:29    














- Physical Exam


Vitals and I&O: 


 Vital Signs











Temp  97.7 F   20 14:00


 


Pulse  94   20 14:00


 


Resp  18   20 14:00


 


BP  139/84   20 14:00


 


Pulse Ox  96   20 14:00








 Intake & Output











 20





 18:59 06:59 18:59


 


Intake Total [phone]


 


Balance [phone]


 


Intake:   


 


  Oral [phone]


 


Other:   


 


  # Voids 4 2 4


 


  # Bowel Movements 1 0 1


 


  Stool Characteristics  Soft Soft





  Formed Formed





  Brown Brown











Active Medications: 


Current Medications





Acetaminophen (Tylenol)  650 mg PO Q4H PRN


   PRN Reason: Pain (Mild 1-3)


   Stop: 20 21:58


   Last Admin: 20 16:06 Dose:  650 mg


Acetaminophen (Tylenol)  650 mg PO Q4HR PRN


   PRN Reason: Temp above 100


   Stop: 05/10/20 00:03


Al Hydrox/Mg Hydrox/Simethicone (Maalox)  30 ml PO Q4HR PRN


   PRN Reason: GI DISTRESS


   Stop: 05/10/20 00:03


Atorvastatin Calcium (Lipitor)  10 mg PO HS [facility]; Protocol


   Stop: 05/10/20 20:59


   Last Admin: 20 20:36 Dose:  10 mg


Carbamazepine (Tegretol)  200 mg PO TID [facility]; Protocol


   Stop: 05/10/20 08:59


   Last Admin: 20 14:57 Dose:  Not Given


Dextrose (Glutose 40%)  18.75 gm PO PRN PRN


   PRN Reason: Blood Glucose less than 70


   Stop: 20 18:08


Docusate Sodium (Colace)  100 mg PO BID [facility]


   Stop: 05/10/20 08:59


   Last Admin: 20 17:34 Dose:  Not Given


Fluphenazine HCl (Prolixin)  10 mg PO DAILY [facility]; Protocol


   Stop: 20 08:59


Glipizide (Glucotrol)  5 mg PO BID [facility]


   Stop: 05/10/20 08:59


   Last Admin: 20 17:07 Dose:  5 mg


Glucagon (Glucagen)  1 mg IM PRN PRN


   PRN Reason: Blood Glucose less than 70


   Stop: 20 18:08


Insulin Human Lispro (Humalog Insulin Sliding Scale)  0 units SUBQ ACHS [facility]; 

Protocol


   Stop: 20 20:59


   Last Admin: 20 17:07 Dose:  Not Given


Lorazepam (Ativan)  0.5 mg PO Q4HR PRN; Protocol


   PRN Reason: Anxiety


   Stop: 20 13:07


   Last Admin: 20 20:36 Dose:  0.5 mg


Magnesium Hydroxide (Milk Of Magnesia)  30 ml PO HS PRN


   PRN Reason: Constipation


   Stop: 05/10/20 00:03


Metformin HCl (Glucophage)  1,000 mg PO BID [facility]


   Stop: 05/10/20 08:59


   Last Admin: 20 17:07 Dose:  1,000 mg


Multivitamins/Vitamin C (Theragran)  1 tab PO DAILY [facility]


   Stop: 05/10/20 08:59


   Last Admin: 20 08:26 Dose:  1 tab


Olanzapine (Zyprexa)  15 mg PO BID [facility]


   Stop: 05/15/20 16:59


   Last Admin: 20 17:07 Dose:  15 mg


Zolpidem Tartrate (Ambien)  5 mg PO HS PRN


   PRN Reason: Insomnia


   Stop: 20 11:16


   Last Admin: 20 21:30 Dose:  5 mg








General: alert, demented


HEENT: NC/AT, PERRLA, EOMI, anicteric sclerae, throat clear


Neck: Supple, No JVD, No thyromegaly, +2 carotid pulse wo bruit, No LAD


Lungs: CTAB


Cardiovascular: Normal S1, Normal S2, without murmur


Abdomen: soft, non-tender, non-distended


Extremities: clear


Neurological: no change, gait stable





Internal Medicine Assmt/Plan





- Assessment


Assessment: 





1.DM.


2.HYPERLIPIDEMIA.


3.SEIZURE DISORDER


4.SCHIZO AFFECTIVE DISORDERS





- Plan


Plan: 





CONTINUE ON CURRENT MEDICATION AND DIET





Nutritional Asmnt/Malnutr-PDOC





- Dietary Evaluation


Malnutrition Findings (Please click <Entered> for more info): 








Nutritional Asmnt/Malnutrition                             Start:  20 13:

27


Text:                                                      Status: Complete    

  


Freq:                                                                          

  


Protocol:                                                                      

  


 Document     20 13:28  AMY  (Rec: 20 13:35  AMY DRIVER-FNS1)


 Nutritional Asmnt/Malnutrition


     Patient General Information


      Nutritional Screening                      Moderate Risk


      Diagnosis                                  Psychosis


      Pertinent Medical Hx/Surgical Hx           Diabetes mellitus, seizure


                                                 disorder, hyperlipidemia,


                                                 schizoaffective disorder


      Subjective Information                     Pt is a 49-year-old female


                                                 admitted on 03/10 d/t acting


                                                 out, agitated, and delusional.


                                                 Pt is eating an estimated 100


                                                 % of meals Per Meal/Nutrition


                                                 Activity Record. Dietary is


                                                 currently providing an


                                                 estimated 2017 kcals and 120


                                                 gm Pro (x2 days), per Pt PO


                                                 intake this is providing an


                                                 estimated 2017 kcals and 120gm


                                                 Pro to meet 100% kcal and 100


                                                 % Pro needs- adequate.


                                                 Visited pt. after lunch,


                                                 introduced myself, inquired


                                                 how she was eating. Pt. stated


                                                 she was eating just fine.


                                                 Spoke to pt. about their high


                                                 TG levels, pt. understood and


                                                 stated they are watching their


                                                 fat intake and reducing meat


                                                 consumption.


                                                 Recommendation to add a


                                                 cardiac diet due hx of


                                                 hyperlipidemia and labs 


                                                 (3/10). Pt. nurse Sara has


                                                 placed the order to change


                                                 diet per recommendation.


                                                 Anthropometrics


                                                 HT: 58


                                                 WT: 186 LB (84.55 kg)


                                                 ABW: 151 LB (68.64)


                                                 BMI: 28.28 (Overweight)


                                                 GI/ Skin Integrity


                                                 GI: WNL, Soft, Non-Tender


                                                 BM: 03/11 x1


                                                 I/O: 240/Not Noted


                                                 Skin: WNL, Intact


                                                 Josse: 21


                                                 Diet Order: [facility] [place]


                                                 Estimated Energy Needs: (


                                                 Geriatric, ABW)


                                                 5344-9651 kcals (25-30 kcals/


                                                 kg)


                                                 69-82g Pro (1.0-1.2 g/kg)


                                                 1700- 2000 ml (25-30 ml/kg)


      Current Diet Order/ Nutrition Support      [facility] [place]


      Pertinent Medications                      Maalox (PRN), Lipitor,


                                                 Tegretol, Colace, Glucotrol,


                                                 MOM, Glucophage, Theragran


      Pertinent Labs                             3/10: Glucose 122, , HDL


                                                 52, Na 124, Ca 8.1, Cl 91


     Nutritional Hx/Data


      Height                                     1.73 m


      Height (Calculated Centimeters)            172.7


      Current Weight (lbs)                       84.368 kg


      Weight (Calculated Kilograms)              84.4


      Weight (Calculated Grams)                  97285.2


      Ideal Body Weight                          140


      % Ideal Body Weight                        107


      Body Mass Index (BMI)                      28.3


      Weight Status                              Overweight


     GI Symptoms


      GI Symptoms                                None


      Last BM                                    


      Skin Integrity/Comment:                    Skin: WNL, Intact


                                                 Josse: 21


      Current %PO                                Good (%)


     Estimated Nutritional Goals


      BEE in Kcals:                              Adj wt of IBW


      Calories/Kcals/Kg                          25-30


      Kcals Calculated                           5936-6553


      Protein:                                   Adj wt of IBW


      Protein g/k.0-1.2


      Protein Calculated                         69-82


      Fluid: ml                                  1700- 2000 ml (25-30 ml/kg)


     Nutritional Problem


      1. Problem


       Problem                                   Altered nutrition related labs


       Etiology                                  labs r/t endocrine dysfunction


                                                 and history of hyperlipidemia


       Signs/Symptoms:                           aeb recent laboratory values,


                                                 Glucose 122, 


     Malnutrition Related to Morbid Obesity


      Malnutrition related to morbid obesity     No


     Intervention/Recommendation


      Comments                                   1. Recommendation to add a


                                                 cardiac diet to current diet


                                                 rx.


                                                 2. Continue antihyperglycemic


                                                 medications for glucose


                                                 control per MD order.


     Expected Outcomes/Goals


      Expected Outcomes/Goals                    1. PO intake to continue to


                                                 meet >75% of estimated


                                                 nutritional needs.


                                                 2.Monitor PO intake, wt,


                                                 nutrition related labs, and


                                                 skin integrity to trend WNL.


                                                 3. F/U as low risk in 7-10


                                                 days, 3/20-

## 2020-03-24 RX ADMIN — INSULIN LISPRO SCH: 100 INJECTION, SOLUTION INTRAVENOUS; SUBCUTANEOUS at 11:30

## 2020-03-24 RX ADMIN — INSULIN LISPRO SCH UNITS: 100 INJECTION, SOLUTION INTRAVENOUS; SUBCUTANEOUS at 20:35

## 2020-03-24 RX ADMIN — INSULIN LISPRO SCH UNITS: 100 INJECTION, SOLUTION INTRAVENOUS; SUBCUTANEOUS at 16:49

## 2020-03-24 RX ADMIN — INSULIN LISPRO SCH UNITS: 100 INJECTION, SOLUTION INTRAVENOUS; SUBCUTANEOUS at 06:49

## 2020-03-24 NOTE — INTERNAL MEDICINE PROG NOTE
Internal Medicine Subjective





- Subjective


Service Date: 20


Patient seen and examined:: without staff (SHE IS FEELING WELL)


Patient is:: awake, verbal, ambulating, talking, confused


Per staff patient has:: no adverse event, eating well, confused, tolerating meds





Internal Medicine Objective





- Results


Recent Labs: 


 Laboratory Last Values











POC Glucose  178 MG/DL (70 - 105)  H  20  19:33    














- Physical Exam


Vitals and I&O: 


 Vital Signs











Temp  0 F   20 20:13


 


Pulse  94   20 14:00


 


Resp  20   20 14:00


 


BP  137/80   20 14:00


 


Pulse Ox  97   20 14:00








 Intake & Output











 20





 06:59 18:59 06:59


 


Intake Total 480 1000 120


 


Balance 480 1000 120


 


Intake:   


 


  Oral 480 1000 120


 


Other:   


 


  # Voids 1 4 3


 


  # Bowel Movements  1 0


 


  Stool Characteristics Soft Soft 





 Formed Formed 





 Brown Brown 











Active Medications: 


Current Medications





Acetaminophen (Tylenol)  650 mg PO Q4H PRN


   PRN Reason: Pain (Mild 1-3)


   Stop: 20 21:58


   Last Admin: 20 16:06 Dose:  650 mg


Acetaminophen (Tylenol)  650 mg PO Q4HR PRN


   PRN Reason: Temp above 100


   Stop: 05/10/20 00:03


Al Hydrox/Mg Hydrox/Simethicone (Maalox)  30 ml PO Q4HR PRN


   PRN Reason: GI DISTRESS


   Stop: 05/10/20 00:03


Atorvastatin Calcium (Lipitor)  10 mg PO HS [facility]; Protocol


   Stop: 05/10/20 20:59


   Last Admin: 20 20:47 Dose:  10 mg


Carbamazepine (Tegretol)  200 mg PO TID [facility]; Protocol


   Stop: 05/10/20 08:59


   Last Admin: 20 14:34 Dose:  Not Given


Dextrose (Glutose 40%)  18.75 gm PO PRN PRN


   PRN Reason: Blood Glucose less than 70


   Stop: 20 18:08


Docusate Sodium (Colace)  100 mg PO BID [facility]


   Stop: 05/10/20 08:59


   Last Admin: 20 17:02 Dose:  Not Given


Fluphenazine HCl (Prolixin)  10 mg PO DAILY [facility]; Protocol


   Stop: 20 08:59


   Last Admin: 20 09:08 Dose:  10 mg


Glipizide (Glucotrol)  5 mg PO BID [facility]


   Stop: 05/10/20 08:59


   Last Admin: 20 16:49 Dose:  5 mg


Glucagon (Glucagen)  1 mg IM PRN PRN


   PRN Reason: Blood Glucose less than 70


   Stop: 20 18:08


Insulin Human Lispro (Humalog Insulin Sliding Scale)  0 units SUBQ ACHS [facility]; 

Protocol


   Stop: 20 20:59


   Last Admin: 20 16:49 Dose:  2 units


Lorazepam (Ativan)  0.5 mg PO Q4HR PRN; Protocol


   PRN Reason: Anxiety


   Stop: 20 13:07


   Last Admin: 20 20:36 Dose:  0.5 mg


Magnesium Hydroxide (Milk Of Magnesia)  30 ml PO HS PRN


   PRN Reason: Constipation


   Stop: 05/10/20 00:03


Metformin HCl (Glucophage)  1,000 mg PO BID [facility]


   Stop: 05/10/20 08:59


   Last Admin: 20 16:48 Dose:  1,000 mg


Multivitamins/Vitamin C (Theragran)  1 tab PO DAILY [facility]


   Stop: 05/10/20 08:59


   Last Admin: 20 09:09 Dose:  1 tab


Olanzapine (Zyprexa)  15 mg PO BID [facility]


   Stop: 05/15/20 16:59


   Last Admin: 20 16:49 Dose:  15 mg


Zolpidem Tartrate (Ambien)  5 mg PO HS PRN


   PRN Reason: Insomnia


   Stop: 20 11:16


   Last Admin: 20 20:47 Dose:  5 mg








General: alert, demented


HEENT: NC/AT, PERRLA, EOMI, anicteric sclerae, throat clear


Neck: Supple, No JVD, No thyromegaly, +2 carotid pulse wo bruit, No LAD


Lungs: CTAB


Cardiovascular: Normal S1, Normal S2, without murmur


Abdomen: soft, non-tender, non-distended


Extremities: clear


Neurological: no change, gait stable





Internal Medicine Assmt/Plan





- Assessment


Assessment: 





1.DM.


2.HYPERLIPIDEMIA.


3.SEIZURE DISORDER


4.SCHIZO AFFECTIVE DISORDERS





- Plan


Plan: 





CONTINUE ON CURRENT MEDICATION AND DIET





Nutritional Asmnt/Malnutr-PDOC





- Dietary Evaluation


Malnutrition Findings (Please click <Entered> for more info): 








Nutritional Asmnt/Malnutrition                             Start:  20 13:

27


Text:                                                      Status: Complete    

  


Freq:                                                                          

  


Protocol:                                                                      

  


 Document     20 13:28  AMY  (Rec: 20 13:35  AMY DRIVER-FNS1)


 Nutritional Asmnt/Malnutrition


     Patient General Information


      Nutritional Screening                      Moderate Risk


      Diagnosis                                  Psychosis


      Pertinent Medical Hx/Surgical Hx           Diabetes mellitus, seizure


                                                 disorder, hyperlipidemia,


                                                 schizoaffective disorder


      Subjective Information                     Pt is a 49-year-old female


                                                 admitted on 03/10 d/t acting


                                                 out, agitated, and delusional.


                                                 Pt is eating an estimated 100


                                                 % of meals Per Meal/Nutrition


                                                 Activity Record. Dietary is


                                                 currently providing an


                                                 estimated 2017 kcals and 120


                                                 gm Pro (x2 days), per Pt PO


                                                 intake this is providing an


                                                 estimated 2017 kcals and 120gm


                                                 Pro to meet 100% kcal and 100


                                                 % Pro needs- adequate.


                                                 Visited pt. after lunch,


                                                 introduced myself, inquired


                                                 how she was eating. Pt. stated


                                                 she was eating just fine.


                                                 Spoke to pt. about their high


                                                 TG levels, pt. understood and


                                                 stated they are watching their


                                                 fat intake and reducing meat


                                                 consumption.


                                                 Recommendation to add a


                                                 cardiac diet due hx of


                                                 hyperlipidemia and labs 


                                                 (3/10). Pt. nurse Sara has


                                                 placed the order to change


                                                 diet per recommendation.


                                                 Anthropometrics


                                                 HT: 58


                                                 WT: 186 LB (84.55 kg)


                                                 ABW: 151 LB (68.64)


                                                 BMI: 28.28 (Overweight)


                                                 GI/ Skin Integrity


                                                 GI: WNL, Soft, Non-Tender


                                                 BM: 03/11 x1


                                                 I/O: 240/Not Noted


                                                 Skin: WNL, Intact


                                                 Josse: 21


                                                 Diet Order: [facility]


                                                 Estimated Energy Needs: (


                                                 Geriatric, ABW)


                                                 7205-0585 kcals (25-30 kcals/


                                                 kg)


                                                 69-82g Pro (1.0-1.2 g/kg)


                                                 1700- 2000 ml (25-30 ml/kg)


      Current Diet Order/ Nutrition Support      [facility]


      Pertinent Medications                      Maalox (PRN), Lipitor,


                                                 Tegretol, Colace, Glucotrol,


                                                 MOM, Glucophage, Theragran


      Pertinent Labs                             3/10: Glucose 122, , HDL


                                                 52, Na 124, Ca 8.1, Cl 91


     Nutritional Hx/Data


      Height                                     1.73 m


      Height (Calculated Centimeters)            172.7


      Current Weight (lbs)                       84.368 kg


      Weight (Calculated Kilograms)              84.4


      Weight (Calculated Grams)                  88485.2


      Ideal Body Weight                          140


      % Ideal Body Weight                        107


      Body Mass Index (BMI)                      28.3


      Weight Status                              Overweight


     GI Symptoms


      GI Symptoms                                None


      Last BM                                    


      Skin Integrity/Comment:                    Skin: WNL, Intact


                                                 Josse: 21


      Current %PO                                Good (%)


     Estimated Nutritional Goals


      BEE in Kcals:                              Adj wt of IBW


      Calories/Kcals/Kg                          25-30


      Kcals Calculated                           2243-1066


      Protein:                                   Adj wt of IBW


      Protein g/k.0-1.2


      Protein Calculated                         69-82


      Fluid: ml                                  1700- 2000 ml (25-30 ml/kg)


     Nutritional Problem


      1. Problem


       Problem                                   Altered nutrition related labs


       Etiology                                  labs r/t endocrine dysfunction


                                                 and history of hyperlipidemia


       Signs/Symptoms:                           aeb recent laboratory values,


                                                 Glucose 122, 


     Malnutrition Related to Morbid Obesity


      Malnutrition related to morbid obesity     No


     Intervention/Recommendation


      Comments                                   1. Recommendation to add a


                                                 cardiac diet to current diet


                                                 rx.


                                                 2. Continue antihyperglycemic


                                                 medications for glucose


                                                 control per MD order.


     Expected Outcomes/Goals


      Expected Outcomes/Goals                    1. PO intake to continue to


                                                 meet >75% of estimated


                                                 nutritional needs.


                                                 2.Monitor PO intake, wt,


                                                 nutrition related labs, and


                                                 skin integrity to trend WNL.


                                                 3. F/U as low risk in 7-10


                                                 days, 3/20-

## 2020-03-24 NOTE — PROGRESS NOTES
DATE:  03/24/2020



Case was discussed with staff of the patient, reviewed records.  The patient

continues to refuse Tegretol, but she is taking the rest of her medication.  I

did increase her perphenazine yesterday.  She is sleeping well, eating well.  No

side effects with the medication, no sedation, no nausea, no extrapyramidal

symptoms.  She continues to have poor insight in general.  We will continue

outpatient group therapy, milieu therapy, and adjust the medications as needed.





DD: 03/24/2020 11:19

DT: 03/24/2020 15:16

JOB# [id number]  [id number]

## 2020-03-25 RX ADMIN — INSULIN LISPRO SCH UNITS: 100 INJECTION, SOLUTION INTRAVENOUS; SUBCUTANEOUS at 07:08

## 2020-03-25 RX ADMIN — INSULIN LISPRO SCH: 100 INJECTION, SOLUTION INTRAVENOUS; SUBCUTANEOUS at 16:55

## 2020-03-25 RX ADMIN — INSULIN LISPRO SCH UNITS: 100 INJECTION, SOLUTION INTRAVENOUS; SUBCUTANEOUS at 11:29

## 2020-03-25 RX ADMIN — INSULIN LISPRO SCH UNITS: 100 INJECTION, SOLUTION INTRAVENOUS; SUBCUTANEOUS at 20:36

## 2020-03-25 NOTE — PROGRESS NOTES
DATE:  03/25/2020



Case was discussed with staff of the patient, reviewed records.  The patient

continues to be unpredictable, impulsive, refusing Tegretol, but she says she is

less agitated, less shaky, does not want to take it.  I did increase her

fluphenazine to 10 mg daily.  She is also on Zyprexa 15 mg twice a day with no

side effects, no sedation, no nausea, no extrapyramidal symptoms.  She reports

that she wants to go to a board and care; however, the patient originally came

from [place] and she has a conservator.  We will continue outpatient group

therapy, milieu therapy, adjust medication as needed.





DD: 03/25/2020 11:38

DT: 03/25/2020 14:09

JOB# [id number]  [id number]

## 2020-03-26 RX ADMIN — INSULIN LISPRO SCH: 100 INJECTION, SOLUTION INTRAVENOUS; SUBCUTANEOUS at 16:58

## 2020-03-26 RX ADMIN — OLANZAPINE SCH MG: 2.5 TABLET ORAL at 17:00

## 2020-03-26 RX ADMIN — INSULIN LISPRO SCH UNITS: 100 INJECTION, SOLUTION INTRAVENOUS; SUBCUTANEOUS at 06:33

## 2020-03-26 RX ADMIN — INSULIN LISPRO SCH: 100 INJECTION, SOLUTION INTRAVENOUS; SUBCUTANEOUS at 11:30

## 2020-03-26 RX ADMIN — INSULIN LISPRO SCH: 100 INJECTION, SOLUTION INTRAVENOUS; SUBCUTANEOUS at 21:32

## 2020-03-26 NOTE — PROGRESS NOTES
DATE:  03/26/2020



Case was discussed with staff of the patient, reviewed records.  The patient

continues to be irrational, unpredictable, impulsive, refusing to take Tegretol

or carbamazepine.  She reports she be on it.  She tolerated increase in

fluphenazine with no side effects, also on Zyprexa 15 mg twice a day with no

side effects, no sedation, no nausea, no extrapyramidal symptoms.  I will be

increasing her fluphenazine to 17.5 mg twice a day, so far no side effects with

the medication, no sedation, no nausea, no extrapyramidal symptoms.  We will

continue outpatient group therapy, milieu therapy, and adjust medications as

needed.





DD: 03/26/2020 10:51

DT: 03/26/2020 11:47

JOB# [id number]  [id number]

## 2020-03-26 NOTE — INTERNAL MEDICINE PROG NOTE
Internal Medicine Subjective





- Subjective


Service Date: 20


Patient seen and examined:: without staff (SHE FEELS WELL)


Patient is:: awake, verbal, ambulating, talking, confused


Per staff patient has:: no adverse event, eating well, confused, tolerating meds





Internal Medicine Objective





- Results


Recent Labs: 


 Laboratory Last Values











POC Glucose  104 MG/DL (70 - 105)   20  11:56    














- Physical Exam


Vitals and I&O: 


 Vital Signs











Temp  97.6 F   20 20:10


 


Pulse  90   20 20:10


 


Resp  20   20 20:10


 


BP  101/50   20 20:10


 


Pulse Ox  97   20 20:10








 Intake & Output











 20





 18:59 06:59 18:59


 


Intake Total 1050 240 


 


Balance 1050 240 


 


Intake:   


 


  Oral 1050 240 


 


Other:   


 


  # Voids  2 


 


  # Bowel Movements  1 


 


  Stool Characteristics Soft  





 Formed  





 Brown  











Active Medications: 


Current Medications





Acetaminophen (Tylenol)  650 mg PO Q4H PRN


   PRN Reason: Pain (Mild 1-3)


   Stop: 20 21:58


   Last Admin: 20 14:29 Dose:  650 mg


Acetaminophen (Tylenol)  650 mg PO Q4HR PRN


   PRN Reason: Temp above 100


   Stop: 05/10/20 00:03


Al Hydrox/Mg Hydrox/Simethicone (Maalox)  30 ml PO Q4HR PRN


   PRN Reason: GI DISTRESS


   Stop: 05/10/20 00:03


Atorvastatin Calcium (Lipitor)  10 mg PO HS [facility]; Protocol


   Stop: 05/10/20 20:59


   Last Admin: 20 20:36 Dose:  10 mg


Carbamazepine (Tegretol)  200 mg PO TID [facility]; Protocol


   Stop: 05/10/20 08:59


   Last Admin: 20 08:34 Dose:  200 mg


Dextrose (Glutose 40%)  18.75 gm PO PRN PRN


   PRN Reason: Blood Glucose less than 70


   Stop: 20 18:08


Docusate Sodium (Colace)  100 mg PO BID [facility]


   Stop: 05/10/20 08:59


   Last Admin: 20 08:34 Dose:  Not Given


Fluphenazine HCl (Prolixin)  10 mg PO DAILY [facility]; Protocol


   Stop: 20 08:59


   Last Admin: 20 08:33 Dose:  10 mg


Glipizide (Glucotrol)  5 mg PO BID [facility]


   Stop: 05/10/20 08:59


   Last Admin: 20 08:34 Dose:  5 mg


Glucagon (Glucagen)  1 mg IM PRN PRN


   PRN Reason: Blood Glucose less than 70


   Stop: 20 18:08


Insulin Human Lispro (Humalog Insulin Sliding Scale)  0 units SUBQ ACHS [facility]; 

Protocol


   Stop: 20 20:59


   Last Admin: 20 06:33 Dose:  2 units


Lorazepam (Ativan)  0.5 mg PO Q4HR PRN; Protocol


   PRN Reason: Anxiety


   Stop: 20 13:07


   Last Admin: 20 20:36 Dose:  0.5 mg


Magnesium Hydroxide (Milk Of Magnesia)  30 ml PO HS PRN


   PRN Reason: Constipation


   Stop: 05/10/20 00:03


Metformin HCl (Glucophage)  1,000 mg PO BID [facility]


   Stop: 05/10/20 08:59


   Last Admin: 20 08:34 Dose:  1,000 mg


Multivitamins/Vitamin C (Theragran)  1 tab PO DAILY [facility]


   Stop: 05/10/20 08:59


   Last Admin: 20 08:48 Dose:  1 tab


Olanzapine 15 mg/ Olanzapine 2 (.5 mg)  17.5 mg PO BID [facility]


   Stop: 20 16:59


Zolpidem Tartrate (Ambien)  5 mg PO HS PRN


   PRN Reason: Insomnia


   Stop: 20 11:16


   Last Admin: 20 20:52 Dose:  5 mg








General: alert, demented


HEENT: NC/AT, PERRLA, EOMI, anicteric sclerae, throat clear


Neck: Supple, No JVD, No thyromegaly, +2 carotid pulse wo bruit, No LAD


Lungs: CTAB


Cardiovascular: Normal S1, Normal S2, without murmur


Abdomen: soft, non-tender, non-distended


Extremities: clear


Neurological: no change, gait stable





Internal Medicine Assmt/Plan





- Assessment


Assessment: 





1.DM.


2.HYPERLIPIDEMIA.


3.SEIZURE DISORDER


4.SCHIZO AFFECTIVE DISORDERS





- Plan


Plan: 





CONTINUE ON CURRENT MEDICATION AND DIET





Nutritional Asmnt/Malnutr-PDOC





- Dietary Evaluation


Malnutrition Findings (Please click <Entered> for more info): 








Nutritional Asmnt/Malnutrition                             Start:  20 13:

27


Text:                                                      Status: Complete    

  


Freq:                                                                          

  


Protocol:                                                                      

  


 Document     20 13:28  AYM  (Rec: 20 13:35  [id number])


 Nutritional Asmnt/Malnutrition


     Patient General Information


      Nutritional Screening                      Moderate Risk


      Diagnosis                                  Psychosis


      Pertinent Medical Hx/Surgical Hx           Diabetes mellitus, seizure


                                                 disorder, hyperlipidemia,


                                                 schizoaffective disorder


      Subjective Information                     Pt is a 49-year-old female


                                                 admitted on 03/10 d/t acting


                                                 out, agitated, and delusional.


                                                 Pt is eating an estimated 100


                                                 % of meals Per Meal/Nutrition


                                                 Activity Record. Dietary is


                                                 currently providing an


                                                 estimated 2017 kcals and 120


                                                 gm Pro (x2 days), per Pt PO


                                                 intake this is providing an


                                                 estimated 2017 kcals and 120gm


                                                 Pro to meet 100% kcal and 100


                                                 % Pro needs- adequate.


                                                 Visited pt. after lunch,


                                                 introduced myself, inquired


                                                 how she was eating. Pt. stated


                                                 she was eating just fine.


                                                 Spoke to pt. about their high


                                                 TG levels, pt. understood and


                                                 stated they are watching their


                                                 fat intake and reducing meat


                                                 consumption.


                                                 Recommendation to add a


                                                 cardiac diet due hx of


                                                 hyperlipidemia and labs 


                                                 (3/10). Pt. nurse Sara has


                                                 placed the order to change


                                                 diet per recommendation.


                                                 Anthropometrics


                                                 HT: 58


                                                 WT: 186 LB (84.55 kg)


                                                 ABW: 151 LB (68.64)


                                                 BMI: 28.28 (Overweight)


                                                 GI/ Skin Integrity


                                                 GI: WNL, Soft, Non-Tender


                                                 BM: 03/11 x1


                                                 I/O: 240/Not Noted


                                                 Skin: WNL, Intact


                                                 Josse: 21


                                                 Diet Order: [facility]


                                                 Estimated Energy Needs: (


                                                 Geriatric, ABW)


                                                 4977-2054 kcals (25-30 kcals/


                                                 kg)


                                                 69-82g Pro (1.0-1.2 g/kg)


                                                 1700- 2000 ml (25-30 ml/kg)


      Current Diet Order/ Nutrition Support      [facility]


      Pertinent Medications                      Maalox (PRN), Lipitor,


                                                 Tegretol, Colace, Glucotrol,


                                                 MOM, Glucophage, Theragran


      Pertinent Labs                             3/10: Glucose 122, , HDL


                                                 52, Na 124, Ca 8.1, Cl 91


     Nutritional Hx/Data


      Height                                     1.73 m


      Height (Calculated Centimeters)            172.7


      Current Weight (lbs)                       84.368 kg


      Weight (Calculated Kilograms)              84.4


      Weight (Calculated Grams)                  90186.2


      Ideal Body Weight                          140


      % Ideal Body Weight                        107


      Body Mass Index (BMI)                      28.3


      Weight Status                              Overweight


     GI Symptoms


      GI Symptoms                                None


      Last BM                                    


      Skin Integrity/Comment:                    Skin: WNL, Intact


                                                 Josse: 21


      Current %PO                                Good (%)


     Estimated Nutritional Goals


      BEE in Kcals:                              Adj wt of IBW


      Calories/Kcals/Kg                          25-30


      Kcals Calculated                           3207-5608


      Protein:                                   Adj wt of IBW


      Protein g/k.0-1.2


      Protein Calculated                         69-82


      Fluid: ml                                  1700- 2000 ml (25-30 ml/kg)


     Nutritional Problem


      1. Problem


       Problem                                   Altered nutrition related labs


       Etiology                                  labs r/t endocrine dysfunction


                                                 and history of hyperlipidemia


       Signs/Symptoms:                           aeb recent laboratory values,


                                                 Glucose 122, 


     Malnutrition Related to Morbid Obesity


      Malnutrition related to morbid obesity     No


     Intervention/Recommendation


      Comments                                   1. Recommendation to add a


                                                 cardiac diet to current diet


                                                 rx.


                                                 2. Continue antihyperglycemic


                                                 medications for glucose


                                                 control per MD order.


     Expected Outcomes/Goals


      Expected Outcomes/Goals                    1. PO intake to continue to


                                                 meet >75% of estimated


                                                 nutritional needs.


                                                 2.Monitor PO intake, wt,


                                                 nutrition related labs, and


                                                 skin integrity to trend WNL.


                                                 3. F/U as low risk in 7-10


                                                 days, 3/20-

## 2020-03-26 NOTE — INTERNAL MEDICINE PROG NOTE
Internal Medicine Subjective





- Subjective


Service Date: 20


Patient seen and examined:: without staff (SHE FEELS WELL)


Patient is:: awake, verbal, ambulating, talking, confused


Per staff patient has:: no adverse event, eating well, confused, tolerating meds





Internal Medicine Objective





- Results


Recent Labs: 


 Laboratory Last Values











POC Glucose  104 MG/DL (70 - 105)   20  11:56    














- Physical Exam


Vitals and I&O: 


 Vital Signs











Temp  97.6 F   20 20:10


 


Pulse  90   20 20:10


 


Resp  20   20 20:10


 


BP  101/50   20 20:10


 


Pulse Ox  97   20 20:10








 Intake & Output











 20





 18:59 06:59 18:59


 


Intake Total 1050 240 


 


Balance 1050 240 


 


Intake:   


 


  Oral 1050 240 


 


Other:   


 


  # Voids  2 


 


  # Bowel Movements  1 


 


  Stool Characteristics Soft  





 Formed  





 Brown  











Active Medications: 


Current Medications





Acetaminophen (Tylenol)  650 mg PO Q4H PRN


   PRN Reason: Pain (Mild 1-3)


   Stop: 20 21:58


   Last Admin: 20 14:29 Dose:  650 mg


Acetaminophen (Tylenol)  650 mg PO Q4HR PRN


   PRN Reason: Temp above 100


   Stop: 05/10/20 00:03


Al Hydrox/Mg Hydrox/Simethicone (Maalox)  30 ml PO Q4HR PRN


   PRN Reason: GI DISTRESS


   Stop: 05/10/20 00:03


Atorvastatin Calcium (Lipitor)  10 mg PO HS [facility]; Protocol


   Stop: 05/10/20 20:59


   Last Admin: 20 20:36 Dose:  10 mg


Carbamazepine (Tegretol)  200 mg PO TID [facility]; Protocol


   Stop: 05/10/20 08:59


   Last Admin: 20 08:34 Dose:  200 mg


Dextrose (Glutose 40%)  18.75 gm PO PRN PRN


   PRN Reason: Blood Glucose less than 70


   Stop: 20 18:08


Docusate Sodium (Colace)  100 mg PO BID [facility]


   Stop: 05/10/20 08:59


   Last Admin: 20 08:34 Dose:  Not Given


Fluphenazine HCl (Prolixin)  10 mg PO DAILY [facility]; Protocol


   Stop: 20 08:59


   Last Admin: 20 08:33 Dose:  10 mg


Glipizide (Glucotrol)  5 mg PO BID [facility]


   Stop: 05/10/20 08:59


   Last Admin: 20 08:34 Dose:  5 mg


Glucagon (Glucagen)  1 mg IM PRN PRN


   PRN Reason: Blood Glucose less than 70


   Stop: 20 18:08


Insulin Human Lispro (Humalog Insulin Sliding Scale)  0 units SUBQ ACHS [facility]; 

Protocol


   Stop: 20 20:59


   Last Admin: 20 06:33 Dose:  2 units


Lorazepam (Ativan)  0.5 mg PO Q4HR PRN; Protocol


   PRN Reason: Anxiety


   Stop: 20 13:07


   Last Admin: 20 20:36 Dose:  0.5 mg


Magnesium Hydroxide (Milk Of Magnesia)  30 ml PO HS PRN


   PRN Reason: Constipation


   Stop: 05/10/20 00:03


Metformin HCl (Glucophage)  1,000 mg PO BID [facility]


   Stop: 05/10/20 08:59


   Last Admin: 20 08:34 Dose:  1,000 mg


Multivitamins/Vitamin C (Theragran)  1 tab PO DAILY [facility]


   Stop: 05/10/20 08:59


   Last Admin: 20 08:48 Dose:  1 tab


Olanzapine 15 mg/ Olanzapine 2 (.5 mg)  17.5 mg PO BID [facility]


   Stop: 20 16:59


Zolpidem Tartrate (Ambien)  5 mg PO HS PRN


   PRN Reason: Insomnia


   Stop: 20 11:16


   Last Admin: 20 20:52 Dose:  5 mg








General: alert, demented


HEENT: NC/AT, PERRLA, EOMI, anicteric sclerae, throat clear


Neck: Supple, No JVD, No thyromegaly, +2 carotid pulse wo bruit, No LAD


Lungs: CTAB


Cardiovascular: Normal S1, Normal S2, without murmur


Abdomen: soft, non-tender, non-distended


Extremities: clear


Neurological: no change, gait stable





Internal Medicine Assmt/Plan





- Assessment


Assessment: 





1.DM.


2.HYPERLIPIDEMIA.


3.SEIZURE DISORDER


4.SCHIZO AFFECTIVE DISORDERS





- Plan


Plan: 





CONTINUE ON CURRENT MEDICATION AND DIET





Nutritional Asmnt/Malnutr-PDOC





- Dietary Evaluation


Malnutrition Findings (Please click <Entered> for more info): 








Nutritional Asmnt/Malnutrition                             Start:  20 13:

27


Text:                                                      Status: Complete    

  


Freq:                                                                          

  


Protocol:                                                                      

  


 Document     20 13:28  AMY  (Rec: 20 13:35  AMY [id number])


 Nutritional Asmnt/Malnutrition


     Patient General Information


      Nutritional Screening                      Moderate Risk


      Diagnosis                                  Psychosis


      Pertinent Medical Hx/Surgical Hx           Diabetes mellitus, seizure


                                                 disorder, hyperlipidemia,


                                                 schizoaffective disorder


      Subjective Information                     Pt is a 49-year-old female


                                                 admitted on 03/10 d/t acting


                                                 out, agitated, and delusional.


                                                 Pt is eating an estimated 100


                                                 % of meals Per Meal/Nutrition


                                                 Activity Record. Dietary is


                                                 currently providing an


                                                 estimated 2017 kcals and 120


                                                 gm Pro (x2 days), per Pt PO


                                                 intake this is providing an


                                                 estimated 2017 kcals and 120gm


                                                 Pro to meet 100% kcal and 100


                                                 % Pro needs- adequate.


                                                 Visited pt. after lunch,


                                                 introduced myself, inquired


                                                 how she was eating. Pt. stated


                                                 she was eating just fine.


                                                 Spoke to pt. about their high


                                                 TG levels, pt. understood and


                                                 stated they are watching their


                                                 fat intake and reducing meat


                                                 consumption.


                                                 Recommendation to add a


                                                 cardiac diet due hx of


                                                 hyperlipidemia and labs 


                                                 (3/10). Pt. nurse Sara has


                                                 placed the order to change


                                                 diet per recommendation.


                                                 Anthropometrics


                                                 HT: 58


                                                 WT: 186 LB (84.55 kg)


                                                 ABW: 151 LB (68.64)


                                                 BMI: 28.28 (Overweight)


                                                 GI/ Skin Integrity


                                                 GI: WNL, Soft, Non-Tender


                                                 BM: 03/11 x1


                                                 I/O: 240/Not Noted


                                                 Skin: WNL, Intact


                                                 Josse: 21


                                                 Diet Order: [facility]


                                                 Estimated Energy Needs: (


                                                 Geriatric, ABW)


                                                 2518-2128 kcals (25-30 kcals/


                                                 kg)


                                                 69-82g Pro (1.0-1.2 g/kg)


                                                 1700- 2000 ml (25-30 ml/kg)


      Current Diet Order/ Nutrition Support      [facility]


      Pertinent Medications                      Maalox (PRN), Lipitor,


                                                 Tegretol, Colace, Glucotrol,


                                                 MOM, Glucophage, Theragran


      Pertinent Labs                             3/10: Glucose 122, , HDL


                                                 52, Na 124, Ca 8.1, Cl 91


     Nutritional Hx/Data


      Height                                     1.73 m


      Height (Calculated Centimeters)            172.7


      Current Weight (lbs)                       84.368 kg


      Weight (Calculated Kilograms)              84.4


      Weight (Calculated Grams)                  33036.2


      Ideal Body Weight                          140


      % Ideal Body Weight                        107


      Body Mass Index (BMI)                      28.3


      Weight Status                              Overweight


     GI Symptoms


      GI Symptoms                                None


      Last BM                                    


      Skin Integrity/Comment:                    Skin: WNL, Intact


                                                 Josse: 21


      Current %PO                                Good (%)


     Estimated Nutritional Goals


      BEE in Kcals:                              Adj wt of IBW


      Calories/Kcals/Kg                          25-30


      Kcals Calculated                           7572-2592


      Protein:                                   Adj wt of IBW


      Protein g/k.0-1.2


      Protein Calculated                         69-82


      Fluid: ml                                  1700- 2000 ml (25-30 ml/kg)


     Nutritional Problem


      1. Problem


       Problem                                   Altered nutrition related labs


       Etiology                                  labs r/t endocrine dysfunction


                                                 and history of hyperlipidemia


       Signs/Symptoms:                           aeb recent laboratory values,


                                                 Glucose 122, 


     Malnutrition Related to Morbid Obesity


      Malnutrition related to morbid obesity     No


     Intervention/Recommendation


      Comments                                   1. Recommendation to add a


                                                 cardiac diet to current diet


                                                 rx.


                                                 2. Continue antihyperglycemic


                                                 medications for glucose


                                                 control per MD order.


     Expected Outcomes/Goals


      Expected Outcomes/Goals                    1. PO intake to continue to


                                                 meet >75% of estimated


                                                 nutritional needs.


                                                 2.Monitor PO intake, wt,


                                                 nutrition related labs, and


                                                 skin integrity to trend WNL.


                                                 3. F/U as low risk in 7-10


                                                 days, 3/20-

## 2020-03-27 RX ADMIN — INSULIN LISPRO SCH: 100 INJECTION, SOLUTION INTRAVENOUS; SUBCUTANEOUS at 16:37

## 2020-03-27 RX ADMIN — OLANZAPINE SCH MG: 2.5 TABLET ORAL at 16:06

## 2020-03-27 RX ADMIN — OLANZAPINE SCH MG: 2.5 TABLET ORAL at 08:49

## 2020-03-27 RX ADMIN — INSULIN LISPRO SCH: 100 INJECTION, SOLUTION INTRAVENOUS; SUBCUTANEOUS at 11:22

## 2020-03-27 RX ADMIN — INSULIN LISPRO SCH UNITS: 100 INJECTION, SOLUTION INTRAVENOUS; SUBCUTANEOUS at 06:48

## 2020-03-27 NOTE — DISCHARGE SUMMARY
DATE OF DISCHARGE:  03/27/2020



IDENTIFYING INFORMATION:  The patient is a 49-year-old female.



CHIEF COMPLAINT:  "I need to leave."



HISTORY OF PRESENT ILLNESS:  ____ The patient has been acting out.  She was

treated by [facility] because she has been psychotic, agitated,

delusional that everyone is against her and wants to harm them and wants to kill

her giving her lot of fear.  The patient was sent to this facility.  She was

scratching her face causing it to be red.  The patient has been noncompliant

with her medication, ____ , minimizing events that led to admission, reports of

chemical imbalance.  She admits she has tried to cut herself where she was used

to being homeless and that is why she was conserved.  She denies any current

visual hallucination.  She is not a very good historian.  She is 49 years old,

unable to state a safe plan for self-care.  Denies substance abuse.  She has

many hospitalizations in many reasons.  She has schizoaffective disorder.  She

was hospitalized at least 10 times.



COURSE IN THE HOSPITAL:  Please consider medication on Zyprexa increased to 50

mg twice a day.  Also, she was on fluphenazine, which was also increased to 5 mg

twice a day, slowly.  The patient refused to take carbamazepine, but could not

reach her for that reason.  She is a second antipsychotic.  The patient was

continued with atorvastatin, ____ high blood sugar, glucagon and trigeminy term

by Dr. Maciel.  The patient as she improved, she was no longer psychotic.  She

was sleeping well, eating well.  We felt she could be discharged to a lesser

level of care.  I would like to change olanzapine which was actually increased

to 17.5 mg twice a day as the patient is doing well, felt the patient was

discharged to a lesser level of care.



FINAL DIAGNOSIS:  Schizoaffective disorder, bipolar type.



MEDICAL DIAGNOSES:  As per Dr. Maciel, the patient will go back to [place], will follow up with the psychiatrist and primary care physician there.



EXPECTED OUTCOME:  Stable if the patient complies with the above.





DD: 03/27/2020 12:15

DT: 03/27/2020 12:27

JOB# [id number]  [id number]

## 2021-05-30 ENCOUNTER — HOSPITAL ENCOUNTER (EMERGENCY)
Dept: HOSPITAL 4 - SED | Age: 51
LOS: 1 days | Discharge: PSYCHIATRIC OR SUBSTANCE USE FACILITY | End: 2021-05-31
Payer: COMMERCIAL

## 2021-05-30 VITALS — HEIGHT: 65 IN | BODY MASS INDEX: 29.99 KG/M2 | WEIGHT: 180 LBS

## 2021-05-30 VITALS — SYSTOLIC BLOOD PRESSURE: 158 MMHG

## 2021-05-30 DIAGNOSIS — E11.9: ICD-10-CM

## 2021-05-30 DIAGNOSIS — Z88.8: ICD-10-CM

## 2021-05-30 DIAGNOSIS — F20.0: Primary | ICD-10-CM

## 2021-05-30 DIAGNOSIS — J44.9: ICD-10-CM

## 2021-05-30 DIAGNOSIS — Z20.822: ICD-10-CM

## 2021-05-30 LAB
ALBUMIN SERPL BCP-MCNC: 3.6 G/DL (ref 3.4–4.8)
ALT SERPL W P-5'-P-CCNC: 30 U/L (ref 12–78)
AMPHETAMINES UR QL SCN: NEGATIVE
ANION GAP SERPL CALCULATED.3IONS-SCNC: 8 MMOL/L (ref 5–15)
APAP SERPL-MCNC: < 1 UG/ML (ref 1–30)
APPEARANCE UR: CLEAR
AST SERPL W P-5'-P-CCNC: 21 U/L (ref 10–37)
BACTERIA URNS QL MICRO: (no result) /HPF
BARBITURATES UR QL SCN: NEGATIVE
BASOPHILS # BLD AUTO: 0.1 K/UL (ref 0–0.2)
BASOPHILS NFR BLD AUTO: 0.7 % (ref 0–2)
BENZODIAZ UR QL SCN: NEGATIVE
BILIRUB SERPL-MCNC: 0.2 MG/DL (ref 0–1)
BILIRUB UR QL STRIP: NEGATIVE
BUN SERPL-MCNC: 8 MG/DL (ref 8–21)
BZE UR QL SCN: NEGATIVE
CALCIUM SERPL-MCNC: 8.7 MG/DL (ref 8.4–11)
CANNABINOIDS UR QL SCN: NEGATIVE
CHLORIDE SERPL-SCNC: 94 MMOL/L (ref 98–107)
CHOLEST SERPL-MCNC: 149 MG/DL (ref ?–200)
COLOR UR: YELLOW
CREAT SERPL-MCNC: 0.76 MG/DL (ref 0.55–1.3)
EOSINOPHIL # BLD AUTO: 0.3 K/UL (ref 0–0.4)
EOSINOPHIL NFR BLD AUTO: 3.5 % (ref 0–4)
ERYTHROCYTE [DISTWIDTH] IN BLOOD BY AUTOMATED COUNT: 13.3 % (ref 9–15)
ETHANOL SERPL-MCNC: < 3 MG/DL (ref ?–10)
GFR SERPL CREATININE-BSD FRML MDRD: 103 ML/MIN (ref 90–?)
GLUCOSE SERPL-MCNC: 140 MG/DL (ref 70–99)
GLUCOSE UR STRIP-MCNC: NEGATIVE MG/DL
HCT VFR BLD AUTO: 33.7 % (ref 36–48)
HDLC SERPL-MCNC: 61 MG/DL (ref 55–?)
HGB BLD-MCNC: 11.4 G/DL (ref 12–16)
HGB UR QL STRIP: NEGATIVE
KETONES UR STRIP-MCNC: NEGATIVE MG/DL
LDL CHOLESTEROL: 67 MG/DL (ref ?–100)
LEUKOCYTE ESTERASE UR QL STRIP: (no result)
LYMPHOCYTES # BLD AUTO: 2.7 K/UL (ref 1–5.5)
LYMPHOCYTES NFR BLD AUTO: 31.1 % (ref 20.5–51.5)
MCH RBC QN AUTO: 29 PG (ref 27–31)
MCHC RBC AUTO-ENTMCNC: 34 % (ref 32–36)
MCV RBC AUTO: 87 FL (ref 79–98)
METHADONE UR-SCNC: NEGATIVE UMOL/L
METHAMPHET UR-SCNC: NEGATIVE UMOL/L
MONOCYTES # BLD MANUAL: 0.9 K/UL (ref 0–1)
MONOCYTES # BLD MANUAL: 10 % (ref 1.7–9.3)
NEUTROPHILS # BLD AUTO: 4.8 K/UL (ref 1.8–7.7)
NEUTROPHILS NFR BLD AUTO: 54.7 % (ref 40–70)
NITRITE UR QL STRIP: NEGATIVE
OPIATES UR QL SCN: NEGATIVE
OXYCODONE SERPL-MCNC: NEGATIVE NG/ML
PCP UR QL SCN: NEGATIVE
PH UR STRIP: 6.5 [PH] (ref 5–8)
PLATELET # BLD AUTO: 373 K/UL (ref 130–430)
POTASSIUM SERPL-SCNC: 4 MMOL/L (ref 3.5–5.1)
PROT UR QL STRIP: NEGATIVE
RBC # BLD AUTO: 3.86 MIL/UL (ref 4.2–6.2)
RBC #/AREA URNS HPF: (no result) /HPF (ref 0–3)
SODIUM SERPLBLD-SCNC: 128 MMOL/L (ref 136–145)
SP GR UR STRIP: 1.01 (ref 1–1.03)
TRICYCLICS UR-MCNC: NEGATIVE NG/ML
TRIGL SERPL-MCNC: 139 MG/DL (ref 30–150)
URINE PROPOXYPHENE SCREEN: NEGATIVE
UROBILINOGEN UR STRIP-MCNC: 0.2 MG/DL (ref 0.2–1)
WBC # BLD AUTO: 8.8 K/UL (ref 4.8–10.8)

## 2021-05-30 PROCEDURE — 99285 EMERGENCY DEPT VISIT HI MDM: CPT

## 2021-05-30 PROCEDURE — 85025 COMPLETE CBC W/AUTO DIFF WBC: CPT

## 2021-05-30 PROCEDURE — G0480 DRUG TEST DEF 1-7 CLASSES: HCPCS

## 2021-05-30 PROCEDURE — 80053 COMPREHEN METABOLIC PANEL: CPT

## 2021-05-30 PROCEDURE — 87426 SARSCOV CORONAVIRUS AG IA: CPT

## 2021-05-30 PROCEDURE — 80061 LIPID PANEL: CPT

## 2021-05-30 PROCEDURE — 87081 CULTURE SCREEN ONLY: CPT

## 2021-05-30 PROCEDURE — 80307 DRUG TEST PRSMV CHEM ANLYZR: CPT

## 2021-05-30 PROCEDURE — 36415 COLL VENOUS BLD VENIPUNCTURE: CPT

## 2021-05-30 PROCEDURE — 87086 URINE CULTURE/COLONY COUNT: CPT

## 2021-05-30 PROCEDURE — G0481 DRUG TEST DEF 8-14 CLASSES: HCPCS

## 2021-05-30 PROCEDURE — G0482 DRUG TEST DEF 15-21 CLASSES: HCPCS

## 2021-05-30 PROCEDURE — 83036 HEMOGLOBIN GLYCOSYLATED A1C: CPT

## 2021-05-30 PROCEDURE — 81000 URINALYSIS NONAUTO W/SCOPE: CPT

## 2021-05-31 VITALS — SYSTOLIC BLOOD PRESSURE: 132 MMHG
